# Patient Record
Sex: FEMALE | Race: WHITE | Employment: FULL TIME | URBAN - METROPOLITAN AREA
[De-identification: names, ages, dates, MRNs, and addresses within clinical notes are randomized per-mention and may not be internally consistent; named-entity substitution may affect disease eponyms.]

---

## 2017-02-21 ENCOUNTER — DOCUMENTATION ONLY (OUTPATIENT)
Dept: FAMILY MEDICINE CLINIC | Age: 57
End: 2017-02-21

## 2017-03-17 ENCOUNTER — PATIENT OUTREACH (OUTPATIENT)
Dept: FAMILY MEDICINE CLINIC | Age: 57
End: 2017-03-17

## 2017-03-17 NOTE — PROGRESS NOTES
Patient Hospitalized,  1/2/17 - 3/17/17 at [facility], related to Systolic Congestive Heart Failure/LVAD placement(complications, GI Bleed, Trach on 2/2/17, C-diff). Discharged to [facility] Inpatient REHAB, 3/17/17.  3/17/17, This writer/NN and [facility] access, printed recent progress note for Dr Harriet Crane to review, patient last seen at [facility]/Dr Haynes, Sept 2016. Per documentation, [facility] to accept patient for further Inpatient REHAB. 3/17/17, This writer/NN contacted [facility], Inpatient REHAB, able to confirm patient being admitted today, 3/17/17, agrees to keep [facility] contact information to call with updates, questions or concerns. PLAN: Continue to monitor for discharge. See Previous NN Documentation Note:  RACHEL REPORT. See Media, 11/29/16, office note from [facility] [address], Systolic CHF, Cardiomyopathy(EF 10-15%) on Dobutamine and followed by [facility] , possible Heart Transplant Patient/LVAD as bridge, work-up continues. Also followed by ENDO, A1C 7.5 in December 2016.  2/21/17, This writer/NN and [facility] access, able to confirm patient remains inpatient, admitted, 1/2/17 related to Systolic Congestive Heart Failure/LVAD placement(complications, GI Bleed, Trach on 2/2/17, C-diff, anticipate REHAB). Will continue to monitor for discharge.

## 2017-03-23 ENCOUNTER — PATIENT OUTREACH (OUTPATIENT)
Dept: FAMILY MEDICINE CLINIC | Age: 57
End: 2017-03-23

## 2017-03-29 ENCOUNTER — PATIENT OUTREACH (OUTPATIENT)
Dept: FAMILY MEDICINE CLINIC | Age: 57
End: 2017-03-29

## 2017-03-29 NOTE — PROGRESS NOTES
Patient Hospitalized,  1/2/17 - 3/17/17 at [facility], related to Systolic Congestive Heart Failure/LVAD placement(complications, GI Bleed, Trach on 2/2/17, C-diff). Discharged to [facility] Inpatient REHAB, 3/17/17.  3/29/17, This writer/NN and [facility] access, able to confirm patient remains INPATIENT/REHAB at [facility]. Will continue to monitor for discharge. 3/29/17, This writer/NN attempted to contact [facility], SW/Discharge Planner, Bernard, [phone], left voicemail message with [facility] contact information and request for return call. See Previous NN/Patient Outreach Documentation:  3/23/17, This writer/NN contacted [facility], Inpatient REHAB, able to confirm social work/discharge planner will be [phone]), left voicemail message with [facility] contact information and request for return call. See Previous NN/Patient Outreach Documentation:  Patient last seen by Dr Lee Costa, Sept 2016. Patient also followed by ENDO, last seen Dec 2016(A1C at 7.5). ANTHEM REPORT. See Media, 11/29/16, office note from [facility] [address], Systolic CHF, Cardiomyopathy(EF 10-15%) on Dobutamine and followed by [facility] , possible Heart Transplant Patient/LVAD as bridge, work-up continues. Also followed by ENDO, A1C 7.5 in December 2016.  2/21/17, This writer/NN and [facility] access, able to confirm patient remains inpatient, admitted, 1/2/17 related to Systolic Congestive Heart Failure/LVAD placement(complications, GI Bleed, Trach on 2/2/17, C-diff, anticipate REHAB).

## 2017-04-04 ENCOUNTER — PATIENT OUTREACH (OUTPATIENT)
Dept: FAMILY MEDICINE CLINIC | Age: 57
End: 2017-04-04

## 2017-05-08 DIAGNOSIS — L21.9 SEBORRHEA: ICD-10-CM

## 2017-05-08 RX ORDER — CLOBETASOL PROPIONATE 0.46 MG/ML
SOLUTION TOPICAL
Qty: 50 ML | Refills: 11 | Status: SHIPPED | OUTPATIENT
Start: 2017-05-08 | End: 2017-08-09 | Stop reason: SDUPTHER

## 2017-05-12 RX ORDER — DULOXETIN HYDROCHLORIDE 60 MG/1
CAPSULE, DELAYED RELEASE ORAL
Qty: 90 CAP | Refills: 1 | Status: SHIPPED | OUTPATIENT
Start: 2017-05-12 | End: 2020-03-04

## 2017-05-24 ENCOUNTER — PATIENT OUTREACH (OUTPATIENT)
Dept: FAMILY MEDICINE CLINIC | Age: 57
End: 2017-05-24

## 2017-05-24 NOTE — PROGRESS NOTES
Patient S/P 1/2/17 - 3/17/17 at [facility], related to Systolic Congestive Heart Failure/LVAD placement(complications, GI Bleed, Trach on 2/2/17, C-diff). Discharged to [facility] Inpatient REHAB, 3/17/17  5/24/17, Per chart review, no further ED/Hospitalizations noted at this time, however, no F/U appt scheduled at [facility]. Patient last seen by Dr Alejandra Chong, Sept 2016.  5/24/17, This writer/NN contacted patient at listed phone number, HIPAA verified with 2 identifiers. Patient allowed this writer/NN to explain purpose of call, post hospitalization and importance of F/U appts and continued health maintenance. Patient verbalizes understanding, however states working closely with [facility], does not want to schedule appt with Dr Alejandra Chong at this time. Patient states [facility] complete at this time. Patient agrees to call as needed with changes or concerns and call to schedule F/U appts as needed. Patient denies further questions or concerns at this time. PLAN: Will close above transitions of care/post hospitalization. Should patient return call or be seen for F/U appt, discuss interest in CCM/NN support, discuss symptoms management, further concerns, medications(Compliance and Reconciliation), and F/U appts. Provide instruction, goal work and resource connection as indicated. See Previous NN/Patient Outreach Documentation:  4/4/17, This writer/NN contacted [facility], [phone], able to confirm, patient home, seen by nurse for labs today, 4/4/17 and next visit scheduled for Friday, 4/7/17, agrees to have nurse discuss PCP and request call to [facility] should need further assistance. See Previous NN/Patient Outreach Documentation:  Patient Hospitalized,  1/2/17 - 3/17/17 at [facility], related to Systolic Congestive Heart Failure/LVAD placement(complications, GI Bleed, Trach on 2/2/17, C-diff).  Discharged to [facility] Inpatient [facility], 3/17/17.  3/29/17, This writer/NN and [facility] access, able to confirm patient remains INPATIENT/REHAB at [facility]. Will continue to monitor for discharge. 3/29/17, This writer/NN attempted to contact [facility], SW/Discharge Planner, Elder Pryor, [phone], left voicemail message with [facility] contact information and request for return call. See Previous NN/Patient Outreach Documentation:  Patient last seen by Dr Pascual Foreman, Sept 2016. Patient also followed by ENDO, last seen Dec 2016(A1C at 7.5). RACHEL REPORT. See Media, 11/29/16, office note from [facility] [address], Systolic CHF, Cardiomyopathy(EF 10-15%) on Dobutamine and followed by [facility], possible Heart Transplant Patient/LVAD as bridge, work-up continues. Also followed by ENDO, A1C 7.5 in December 2016.  2/21/17, This writer/NN and [facility] access, able to confirm patient remains inpatient, admitted, 1/2/17 related to Systolic Congestive Heart Failure/LVAD placement(complications, GI Bleed, Trach on 2/2/17, C-diff, anticipate REHAB).

## 2017-05-24 NOTE — Clinical Note
Dr Tereso Rivera, JANNETH---S/P 1/2/17 - 3/17/17 at [facility], related to Systolic Congestive Heart Failure/LVAD placement(complications, GI Bleed, Trach on 2/2/17, C-diff). Discharged to [facility] Inpatient REHAB, 3/17/17 5/24/17, Per chart review, no further ED/Hospitalizations noted at this time, however, no F/U appt scheduled at [facility]. Patient last seen by Dr Tereso Rivera, Sept 2016. 5/24/17, This writer/NN contacted patient at listed phone number, HIPAA verified with 2 identifiers. Patient allowed this writer/NN to explain purpose of call, post hospitalization and importance of F/U appts and continued health maintenance. Patient verbalizes understanding, however states working closely with [facility], does not want to schedule appt with Dr Tereso Rivera at this time. Patient states [facility] complete at this time.  Patient agrees to call as needed with changes or concerns and call to schedule F/U appts as needed

## 2017-07-26 RX ORDER — RIZATRIPTAN BENZOATE 10 MG/1
TABLET, ORALLY DISINTEGRATING ORAL
Qty: 12 TAB | Refills: 5 | Status: SHIPPED | OUTPATIENT
Start: 2017-07-26 | End: 2018-12-24 | Stop reason: SDUPTHER

## 2017-08-08 ENCOUNTER — TELEPHONE (OUTPATIENT)
Dept: FAMILY MEDICINE CLINIC | Age: 57
End: 2017-08-08

## 2017-08-08 DIAGNOSIS — L21.9 SEBORRHEA: ICD-10-CM

## 2017-08-08 NOTE — TELEPHONE ENCOUNTER
Patient called for update on medication request from  [facility] regarding her folliculitis issue. Patient was advised of no medication order received and appointment was required for re-evaluation. This is per Dr. Elisa Rao office notes of December visit.     I have advised to follow up with derm/pcp

## 2017-08-08 NOTE — TELEPHONE ENCOUNTER
Patient called stating that she needs her clobetasol 0.05% solution sent to [facility]. Dr. Joanne Tang wrote this on 5/8/17 at sent it to her [facility] pharmacy, but she states that she now uses [facility] as it is much cheaper. Please advise.

## 2017-08-09 RX ORDER — CLOBETASOL PROPIONATE 0.46 MG/ML
SOLUTION TOPICAL
Qty: 150 ML | Refills: 0 | Status: SHIPPED | COMMUNITY
Start: 2017-08-09 | End: 2018-06-13 | Stop reason: ALTCHOICE

## 2017-08-09 NOTE — TELEPHONE ENCOUNTER
Patient called for status, notified patient request addressed and sent to [facility].  Patient is satisfied

## 2017-10-23 DIAGNOSIS — E78.2 MIXED HYPERLIPIDEMIA: ICD-10-CM

## 2017-10-23 DIAGNOSIS — E11.9 TYPE 2 DIABETES MELLITUS WITHOUT COMPLICATION, WITHOUT LONG-TERM CURRENT USE OF INSULIN (HCC): ICD-10-CM

## 2017-10-24 RX ORDER — SITAGLIPTIN 100 MG/1
TABLET, FILM COATED ORAL
Qty: 90 TAB | Refills: 3 | Status: SHIPPED | OUTPATIENT
Start: 2017-10-24 | End: 2018-11-19 | Stop reason: SDUPTHER

## 2018-02-19 ENCOUNTER — TELEPHONE (OUTPATIENT)
Dept: FAMILY MEDICINE CLINIC | Age: 58
End: 2018-02-19

## 2018-02-19 ENCOUNTER — OFFICE VISIT (OUTPATIENT)
Dept: FAMILY MEDICINE CLINIC | Age: 58
End: 2018-02-19

## 2018-02-19 VITALS
RESPIRATION RATE: 18 BRPM | HEIGHT: 63 IN | SYSTOLIC BLOOD PRESSURE: 101 MMHG | TEMPERATURE: 98.3 F | BODY MASS INDEX: 32.07 KG/M2 | DIASTOLIC BLOOD PRESSURE: 69 MMHG | HEART RATE: 88 BPM | WEIGHT: 181 LBS | OXYGEN SATURATION: 97 %

## 2018-02-19 DIAGNOSIS — G47.00 INSOMNIA, UNSPECIFIED TYPE: ICD-10-CM

## 2018-02-19 RX ORDER — ZOLPIDEM TARTRATE 10 MG/1
TABLET ORAL
Qty: 30 TAB | Refills: 2 | Status: SHIPPED | OUTPATIENT
Start: 2018-02-19 | End: 2018-05-08 | Stop reason: SDUPTHER

## 2018-02-19 NOTE — PROGRESS NOTES
Chief Complaint   Patient presents with    Medication Refill     ambien     1. Have you been to the ER, urgent care clinic since your last visit? Hospitalized since your last visit? No    2. Have you seen or consulted any other health care providers outside of the [address] [place] [address] since your last visit? Include any pap smears or colon screening.  No

## 2018-02-19 NOTE — PROGRESS NOTES
Estuardo Grider  61 y.o. female  1960  [address] [facility] [address] [facility]  [id number]   [address]: Progress Note  Michael Naik MD       Encounter Date: 2018    Chief Complaint   Patient presents with    Medication Refill     Fernando Junior     History of Present Illness   Feliciano Patino is a 61 y.o. female who presents to clinic today for evaluation on insomnia. On and off ambien for many years (). Tomorrow she could fill, but  . Doesn't work like it used to. Had tried belsomna, other medications. Mind would race and race. Both falling and staying sleep. Had been on the 5s for some time, but increased to the 10mg. Notes that when she eats with it, it wont work as well. Review of Systems   Review of Systems   Psychiatric/Behavioral: The patient has insomnia. Vitals/Objective:     Vitals:    18 1456   BP: 101/69   Pulse: 88   Resp: 18   Temp: 98.3 °F (36.8 °C)   TempSrc: Oral   SpO2: 97%   Weight: 181 lb (82.1 kg)   Height: 5' 3\" (1.6 m)     Body mass index is 32.06 kg/(m^2). Physical Exam   Constitutional: She is oriented to person, place, and time. No distress. Cardiovascular: Normal rate, regular rhythm and normal heart sounds. Pulmonary/Chest: Effort normal and breath sounds normal.   Neurological: She is alert and oriented to person, place, and time. Psychiatric: She has a normal mood and affect. Her behavior is normal.       Assessment and Plan:   1. Insomnia, unspecified type  Discussed concern about 10mg Ambien in females and the current recommendations of a max 5mg Ambien in females. Discussed increased risk of adverse effects including prolonged duration of action. Patient has been on this current dose of an extended period of time and notes multiple trials of alternative sleep aids. Will continue at this time, but noted as she gets older,we will need to find alternatives to this sleep aid, or use it at a lower dose.   She expressed understanding and could teach back what we discussed. - zolpidem (AMBIEN) 10 mg tablet; TAKE 1 TABLET BY MOUTH AT BEDTIME AS NEEDED FOR SLEEP  Dispense: 30 Tab; Refill: 2    I have discussed the diagnosis with the patient and the intended plan as seen in the above orders. she has expressed understanding. The patient has received an after-visit summary and questions were answered concerning future plans. I have discussed medication side effects and warnings with the patient as well. Follow-up Disposition:  Return in about 6 months (around 8/19/2018). Electronically Signed: Juana Ramesh MD     History   Patients past medical, surgical and family histories were reviewed and updated. Past Medical History:   Diagnosis Date    CAD (coronary artery disease)     [facility]     Depression     DM (diabetes mellitus) ([facility] [address]     Heart disease     Neuropathy      Past Surgical History:   Procedure Laterality Date    HX BLADDER SUSPENSION  2006, 2009    HX CHOLECYSTECTOMY  2004    HX PACEMAKER PLACEMENT  2008     Family History   Problem Relation Age of Onset    Diabetes Mother     Hypertension Mother     Hypertension Father     Dementia Father     Diabetes Brother     Hypertension Brother      Social History     Social History    Marital status:      Spouse name: N/A    Number of children: N/A    Years of education: N/A     Occupational History    Not on file.      Social History Main Topics    Smoking status: Never Smoker    Smokeless tobacco: Never Used    Alcohol use No    Drug use: No    Sexual activity: Yes     Other Topics Concern    Not on file     Social History Narrative            Current Medications/Allergies     Current Outpatient Prescriptions   Medication Sig Dispense Refill    zolpidem (AMBIEN) 10 mg tablet TAKE 1 TABLET BY MOUTH AT BEDTIME AS NEEDED FOR SLEEP 30 Tab 2    JANUVIA 100 mg tablet TAKE 1 TABLET DAILY 90 Tab 3  DULoxetine (CYMBALTA) 60 mg capsule TAKE 1 CAPSULE DAILY 90 Cap 1    glucose blood VI test strips (ONETOUCH ULTRA TEST) strip Test blood glucose twice daily Dx Code: E11.65 100 Strip 11    LORazepam (ATIVAN) 0.5 mg tablet Take  by mouth.  potassium chloride 20 mEq TbER Take 20 mEq by mouth two (2) times a day.  clobetasol (TEMOVATE) 0.05 % external solution APPLY SOLUTION TOPICALLY ONCE OR TWICE DAILY 150 mL 0    rizatriptan (MAXALT-MLT) 10 mg disintegrating tablet DISSOLVE ONE TABLET IN MOUTH ONCE AS NEEDED FOR MIGRAINE FOR 1 DOSE 12 Tab 5    DOBUTamine (DOBUTREX) 1000 mg/250 ml D5W infusion 2.5-10 mcg/kg/min by IntraVENous route TITRATE.  carvedilol (COREG CR) 10 mg CR capsule Take 40 mg by mouth daily (with breakfast).  fluticasone (FLONASE) 50 mcg/actuation nasal spray 2 Sprays by Both Nostrils route daily. 1 Bottle 5    ketoconazole (NIZORAL) 2 % shampoo Apply  to affected area daily as needed for Itching.  mupirocin (BACTROBAN) 2 % ointment APPLY  OINTMENT TOPICALLY TO AFFECTED AREA ONCE DAILY 22 g 0    spironolactone (ALDACTONE) 25 mg tablet Take 25 mg by mouth daily.  bumetanide (BUMEX) 2 mg tablet Take 1 mg by mouth daily.  lovastatin (MEVACOR) 20 mg tablet TAKE ONE TABLET BY MOUTH ONCE DAILY AT BEDTIME 30 Tab 0    digoxin (LANOXIN) 0.25 mg tablet Take 0.125 mg by mouth daily.        Allergies   Allergen Reactions    Compazine [Prochlorperazine] Nausea and Vomiting    Glimepiride Rash    Morphine Hives    Reglan [Metoclopramide Hcl] Itching    Sulfa (Sulfonamide Antibiotics) Nausea Only    Vicodin [Hydrocodone-Acetaminophen] Hives and Itching

## 2018-02-19 NOTE — PATIENT INSTRUCTIONS

## 2018-02-19 NOTE — MR AVS SNAPSHOT
[address] [phone] Patient: Meg Driscoll MRN: [id number] PUC:9/17/3519 Visit Information Date & Time Provider Department Dept. Phone Encounter #  
 2/19/2018  2:40 PM Maria Del Carmen Tolu, [address] [place] [address] 88 Follow-up Instructions Return in about 6 months (around 8/19/2018). Upcoming Health Maintenance Date Due Hepatitis C Screening 1960 FOOT EXAM Q1 3/21/1969 EYE EXAM RETINAL OR DILATED Q1 3/21/1969 Pneumococcal 19-64 Medium Risk (1 of 1 - PPSV23) 3/21/1978 DTaP/Tdap/Td series (1 - Tdap) 3/21/1980 PAP AKA CERVICAL CYTOLOGY 3/21/1980 BREAST CANCER SCRN MAMMOGRAM 3/21/2009 FOBT Q 1 YEAR AGE 50-75 3/21/2009 LIPID PANEL Q1 5/5/2016 HEMOGLOBIN A1C Q6M 6/15/2017 MICROALBUMIN Q1 7/15/2017 Influenza Age 5 to Adult 8/1/2017 Allergies as of 2/19/2018  Review Complete On: 2/19/2018 By: José Antonio Morris LPN Severity Noted Reaction Type Reactions Compazine [Prochlorperazine]  11/08/2012    Nausea and Vomiting Glimepiride  02/25/2013    Rash Morphine  11/08/2012    Hives Reglan [Metoclopramide Hcl]  11/08/2012    Itching Sulfa (Sulfonamide Antibiotics)  11/08/2012    Nausea Only Vicodin [Hydrocodone-acetaminophen]  11/08/2012    Hives, Itching Current Immunizations  Reviewed on 11/25/2015 Name Date Influenza Vaccine Patrizia Alexanderr) 11/25/2015 Not reviewed this visit You Were Diagnosed With   
  
 Codes Comments Insomnia, unspecified type     ICD-10-CM: G47.00 ICD-9-CM: 780.52 Vitals BP Pulse Temp Resp Height(growth percentile) Weight(growth percentile) 101/69 (BP 1 Location: Right arm, BP Patient Position: Sitting) 88 98.3 °F (36.8 °C) (Oral) 18 5' 3\" (1.6 m) 181 lb (82.1 kg) SpO2 BMI OB Status Smoking Status 97% 32.06 kg/m2 Ablation Never Smoker Vitals History BMI and BSA Data Body Mass Index Body Surface Area 32.06 kg/m 2 1.91 m 2 Preferred Pharmacy Pharmacy Name Phone [phone] [phone] Your Updated Medication List  
  
   
This list is accurate as of: 2/19/18  3:17 PM.  Always use your most recent med list.  
  
  
  
  
 bumetanide 2 mg tablet Commonly known as:  [place] Take 1 mg by mouth daily. clobetasol 0.05 % external solution Commonly known as:  TEMOVATE  
APPLY SOLUTION TOPICALLY ONCE OR TWICE DAILY COREG CR 10 mg CR capsule Generic drug:  carvedilol Take 40 mg by mouth daily (with breakfast). digoxin 0.25 mg tablet Commonly known as:  LANOXIN Take 0.125 mg by mouth daily. DOBUTamine (DOBUTREX) 1000 mg/250 ml D5W infusion 2.5-10 mcg/kg/min by IntraVENous route TITRATE. DULoxetine 60 mg capsule Commonly known as:  CYMBALTA TAKE 1 CAPSULE DAILY  
  
 fluticasone 50 mcg/actuation nasal spray Commonly known as:  Fabiene Gift 2 Sprays by Both Nostrils route daily. glucose blood VI test strips strip Commonly known as:  ONETOUCH ULTRA TEST Test blood glucose twice daily Dx Code: E11.65 JANUVIA 100 mg tablet Generic drug:  SITagliptin TAKE 1 TABLET DAILY  
  
 ketoconazole 2 % shampoo Commonly known as:  NIZORAL Apply  to affected area daily as needed for Itching. LORazepam 0.5 mg tablet Commonly known as:  ATIVAN Take  by mouth.  
  
 lovastatin 20 mg tablet Commonly known as:  MEVACOR  
TAKE ONE TABLET BY MOUTH ONCE DAILY AT BEDTIME  
  
 mupirocin 2 % ointment Commonly known as:  BACTROBAN  
APPLY  OINTMENT TOPICALLY TO AFFECTED AREA ONCE DAILY potassium chloride SR 20 mEq tablet Commonly known as:  K-TAB Take 20 mEq by mouth two (2) times a day. rizatriptan 10 mg disintegrating tablet Commonly known as:  MAXALT-MLT DISSOLVE ONE TABLET IN MOUTH ONCE AS NEEDED FOR MIGRAINE FOR 1 DOSE  
  
 spironolactone 25 mg tablet Commonly known as:  ALDACTONE Take 25 mg by mouth daily. zolpidem 10 mg tablet Commonly known as:  AMBIEN  
TAKE 1 TABLET BY MOUTH AT BEDTIME AS NEEDED FOR SLEEP Prescriptions Printed Refills  
 zolpidem (AMBIEN) 10 mg tablet 2 Sig: TAKE 1 TABLET BY MOUTH AT BEDTIME AS NEEDED FOR SLEEP Class: Print Follow-up Instructions Return in about 6 months (around 8/19/2018). Patient Instructions Learning About Sleeping Well What does sleeping well mean? Sleeping well means getting enough sleep. How much sleep is enough varies among people. The number of hours you sleep is not as important as how you feel when you wake up. If you do not feel refreshed, you probably need more sleep. Another sign of not getting enough sleep is feeling tired during the day. The average total nightly sleep time is 7½ to 8 hours. Healthy adults may need a little more or a little less than this. Why is getting enough sleep important? Getting enough quality sleep is a basic part of good health. When your sleep suffers, your mood and your thoughts can suffer too. You may find yourself feeling more grumpy or stressed. Not getting enough sleep also can lead to serious problems, including injury, accidents, anxiety, and depression. What might cause poor sleeping? Many things can cause sleep problems, including: · Stress. Stress can be caused by fear about a single event, such as giving a speech. Or you may have ongoing stress, such as worry about work or school. · Depression, anxiety, and other mental or emotional conditions. · Changes in your sleep habits or surroundings. This includes changes that happen where you sleep, such as noise, light, or sleeping in a different bed. It also includes changes in your sleep pattern, such as having jet lag or working a late shift. · Health problems, such as pain, breathing problems, and restless legs syndrome. · Lack of regular exercise. How can you help yourself? Here are some tips that may help you sleep more soundly and wake up feeling more refreshed. Your sleeping area · Use your bedroom only for sleeping and sex. A bit of light reading may help you fall asleep. But if it doesn't, do your reading elsewhere in the house. Don't watch TV in bed. · Be sure your bed is big enough to stretch out comfortably, especially if you have a sleep partner. · Keep your bedroom quiet, dark, and cool. Use curtains, blinds, or a sleep mask to block out light. To block out noise, use earplugs, soothing music, or a \"white noise\" machine. Your evening and bedtime routine · Create a relaxing bedtime routine. You might want to take a warm shower or bath, listen to soothing music, or drink a cup of noncaffeinated tea. · Go to bed at the same time every night. And get up at the same time every morning, even if you feel tired. What to avoid · Limit caffeine (coffee, tea, caffeinated sodas) during the day, and don't have any for at least 4 to 6 hours before bedtime. · Don't drink alcohol before bedtime. Alcohol can cause you to wake up more often during the night. · Don't smoke or use tobacco, especially in the evening. Nicotine can keep you awake. · Don't take naps during the day, especially close to bedtime. · Don't lie in bed awake for too long. If you can't fall asleep, or if you wake up in the middle of the night and can't get back to sleep within 15 minutes or so, get out of bed and go to another room until you feel sleepy. · Don't take medicine right before bed that may keep you awake or make you feel hyper or energized. Your doctor can tell you if your medicine may do this and if you can take it earlier in the day. If you can't sleep · Imagine yourself in a peaceful, pleasant scene.  Focus on the details and feelings of being in a place that is relaxing. · Get up and do a quiet or boring activity until you feel sleepy. · Don't drink any liquids after 6 p.m. if you wake up often because you have to go to the bathroom. Where can you learn more? Go to http://rene-bryon.info/. Enter G461 in the search box to learn more about \"Learning About Sleeping Well. \" Current as of: May 12, 2017 Content Version: 11.4 © 4904-0802 [employer]. Care instructions adapted under license by [employer] (which disclaims liability or warranty for this information). If you have questions about a medical condition or this instruction, always ask your healthcare professional. [address] any warranty or liability for your use of this information. Introducing [facility]! Jude Pike introduces PayParrot patient portal. Now you can access parts of your medical record, email your doctor's office, and request medication refills online. 1. In your internet browser, go to [email or web address] in the Cloud 2. Click on the First Time User? Click Here link in the Sign In box. You will see the New Member Sign Up page. 3. Enter your PayParrot Access Code exactly as it appears below. You will not need to use this code after youve completed the sign-up process. If you do not sign up before the expiration date, you must request a new code. · PayParrot Access Code: [email or web address] Expires: 5/20/2018  3:17 PM 
 
4. Enter the last four digits of your Social Security Number (xxxx) and Date of Birth (mm/dd/yyyy) as indicated and click Submit. You will be taken to the next sign-up page. 5. Create a Captualt ID. This will be your PayParrot login ID and cannot be changed, so think of one that is secure and easy to remember. 6. Create a PayParrot password. You can change your password at any time. 7. Enter your Password Reset Question and Answer.  This can be used at a later time if you forget your password. 8. Enter your e-mail address. You will receive e-mail notification when new information is available in [address] 9. Click Sign Up. You can now view and download portions of your medical record. 10. Click the Download Summary menu link to download a portable copy of your medical information. If you have questions, please visit the Frequently Asked Questions section of the [employer] website. Remember, [employer] is NOT to be used for urgent needs. For medical emergencies, dial 911. Now available from your iPhone and Android! Please provide this summary of care documentation to your next provider. Your primary care clinician is listed as Estee Duncan. If you have any questions after today's visit, please call [phone].

## 2018-06-13 ENCOUNTER — OFFICE VISIT (OUTPATIENT)
Dept: FAMILY MEDICINE CLINIC | Age: 58
End: 2018-06-13

## 2018-06-13 VITALS
RESPIRATION RATE: 16 BRPM | HEIGHT: 63 IN | HEART RATE: 87 BPM | OXYGEN SATURATION: 98 % | TEMPERATURE: 98 F | DIASTOLIC BLOOD PRESSURE: 61 MMHG | SYSTOLIC BLOOD PRESSURE: 87 MMHG

## 2018-06-13 DIAGNOSIS — M70.22 OLECRANON BURSITIS OF LEFT ELBOW: Primary | ICD-10-CM

## 2018-06-13 RX ORDER — OXYBUTYNIN CHLORIDE 5 MG/1
5 TABLET ORAL DAILY
COMMUNITY

## 2018-06-13 RX ORDER — AMIODARONE HYDROCHLORIDE 100 MG/1
100 TABLET ORAL DAILY
COMMUNITY
End: 2019-05-03 | Stop reason: ALTCHOICE

## 2018-06-13 RX ORDER — WARFARIN SODIUM 5 MG/1
TABLET ORAL
COMMUNITY

## 2018-06-13 NOTE — PROGRESS NOTES
Chief Complaint   Patient presents with    Mass     on left elbow X 2 wks.  Painful and continues to increase in size

## 2018-06-13 NOTE — PROGRESS NOTES
Samson Krueger is a 60 y.o. female who presents for pain and swelling over the posterior left elbow for about 2 weeks. No injury or trauma. No fever. Sx unchanged for 2 weeks. No rash or redness over the elbow. No self treatments. Hurts to lean on her elbow. PMHx:  Past Medical History:   Diagnosis Date    CAD (coronary artery disease)     [facility] ([facility]     Depression     DM (diabetes mellitus) ([facility]     Heart disease     Neuropathy        Meds:   Current Outpatient Prescriptions   Medication Sig Dispense Refill    warfarin (COUMADIN) 5 mg tablet Take 5 mg by mouth daily.  amiodarone (PACERONE) 100 mg tablet Take 100 mg by mouth daily.  oxybutynin (DITROPAN) 5 mg tablet Take 5 mg by mouth three (3) times daily.  zolpidem (AMBIEN) 10 mg tablet TAKE 1 TABLET BY MOUTH EVERY DAY AT BEDTIME AS NEEDED FOR SLEEP 30 Tab 5    JANUVIA 100 mg tablet TAKE 1 TABLET DAILY 90 Tab 3    rizatriptan (MAXALT-MLT) 10 mg disintegrating tablet DISSOLVE ONE TABLET IN MOUTH ONCE AS NEEDED FOR MIGRAINE FOR 1 DOSE 12 Tab 5    DULoxetine (CYMBALTA) 60 mg capsule TAKE 1 CAPSULE DAILY 90 Cap 1    glucose blood VI test strips (ONETOUCH ULTRA TEST) strip Test blood glucose twice daily Dx Code: E11.65 100 Strip 11    LORazepam (ATIVAN) 0.5 mg tablet Take  by mouth.  potassium chloride 20 mEq TbER Take 20 mEq by mouth two (2) times a day. Allergies:    Allergies   Allergen Reactions    Compazine [Prochlorperazine] Nausea and Vomiting    Glimepiride Rash    Morphine Hives    Reglan [Metoclopramide Hcl] Itching    Sulfa (Sulfonamide Antibiotics) Nausea Only    Vicodin [Hydrocodone-Acetaminophen] Hives and Itching       Smoker:  History   Smoking Status    Never Smoker   Smokeless Tobacco    Never Used       ETOH:   History   Alcohol Use No       FH:   Family History   Problem Relation Age of Onset    Diabetes Mother     Hypertension Mother     Hypertension Father    [facility] Dementia Father     Diabetes Brother     Hypertension Brother        ROS:  Per HPI    Physical Exam:  Visit Vitals    BP (!) 87/61    Pulse 87    Temp 98 °F (36.7 °C)    Resp 16    Ht 5' 3\" (1.6 m)    SpO2 98%     Gen: NAD  MSK: Small amount of localized swelling over the olecranon of the left elbow. FROM of the elbow and wrist flex/ext. FROM of forearm sup/pronation. Full strength of the wrist and elbow. Pain with resisted elbow extension. Tenderness over the olecranon and olecranon bursa. Ext: Sensation intact in the upper ext. Well perfused and no edema. Skin: No rash. Assessment:    ICD-10-CM ICD-9-CM    1. Olecranon bursitis of left elbow M70.22 726.33    Discussed treatment options. Discussed that since she is on coumadin and has DM, she is at risk of bleeding and infection (respectively) with any procedure such as aspiration. Shankar Dark is small and she has not tried conservative management yet. Therefore, recommended that she try conservative management. Plan:  Avoid leaning on elbow. Ice 10 minutes, three times a day PRN    Medications:    1. Naproxin (Aleve): 220mg 1 tablet twice a day PRN. 2. Acetaminophen (Tylenol):  500mg 1-2 tablets every 6 hours as needed for pain.     RTC: PRN

## 2018-07-02 ENCOUNTER — OFFICE VISIT (OUTPATIENT)
Dept: FAMILY MEDICINE CLINIC | Age: 58
End: 2018-07-02

## 2018-07-02 VITALS
HEIGHT: 63 IN | HEART RATE: 86 BPM | SYSTOLIC BLOOD PRESSURE: 118 MMHG | WEIGHT: 179 LBS | DIASTOLIC BLOOD PRESSURE: 89 MMHG | OXYGEN SATURATION: 99 % | BODY MASS INDEX: 31.71 KG/M2 | TEMPERATURE: 98.6 F | RESPIRATION RATE: 16 BRPM

## 2018-07-02 DIAGNOSIS — M70.22 OLECRANON BURSITIS OF LEFT ELBOW: Primary | ICD-10-CM

## 2018-07-02 RX ORDER — AMLODIPINE BESYLATE 5 MG/1
2.5 TABLET ORAL DAILY
COMMUNITY
Start: 2018-05-11

## 2018-07-02 NOTE — PATIENT INSTRUCTIONS
Elbow Bursitis: Exercises  Your Care Instructions  Here are some examples of typical rehabilitation exercises for your condition. Start each exercise slowly. Ease off the exercise if you start to have pain. Your doctor or physical therapist will tell you when you can start these exercises and which ones will work best for you. How to do the exercises  Elbow flexion stretch    1. Lift the arm that bothers you, and bend the elbow. Your palm should face toward you. 2. With your other hand, gently push on the back of your affected forearm. Press your hand toward your shoulder until you feel a stretch in the back of your upper arm. 3. Hold for at least 15 to 30 seconds. 4. Repeat 2 to 4 times. Elbow extension stretch    1. Extend your affected arm in front of you with your palm facing away from you. 2. Bend back your wrist, pointing your hand up toward the ceiling. 3. With your other hand, gently bend your wrist farther until you feel a mild to moderate stretch in your forearm. 4. Hold for at least 15 to 30 seconds. 5. Repeat 2 to 4 times. 6. Repeat steps 1 through 5. But this time extend your affected arm in front of you with your palm facing up. Then bend back your wrist, pointing your hand toward the floor. Pronation and supination stretch    1. Keep your affected elbow at your side, bent at about 90 degrees. Grasp a pen, pencil, or stick, and wrap your hand around it. If you don't have something to hold on to, make a fist instead. 2. Slowly turn your forearm as far as you can back and forth in each direction. Your hand should face up and then down. 3. Hold each position for about 6 seconds. 4. Relax for up to 10 seconds between repetitions. 5. Repeat 8 to 12 times. Hand flips    1. While seated, place your affected forearm on your thigh. Your palm should face down. 2. Flip your hand over so the back of your hand rests on your thigh and your palm is up.  Alternate between palm up and palm down while keeping your forearm on your thigh. 3. Repeat 8 to 12 times. Follow-up care is a key part of your treatment and safety. Be sure to make and go to all appointments, and call your doctor if you are having problems. It's also a good idea to know your test results and keep a list of the medicines you take. Where can you learn more? Go to http://rene-bryon.info/. Enter A704 in the search box to learn more about \"Elbow Bursitis: Exercises. \"  Current as of: March 21, 2017  Content Version: 11.4  © 0225-6348 [employer]. Care instructions adapted under license by [employer] (which disclaims liability or warranty for this information). If you have questions about a medical condition or this instruction, always ask your healthcare professional. [address] any warranty or liability for your use of this information.

## 2018-07-02 NOTE — PROGRESS NOTES
Subjective    Chief complaint: follow-up of olecrenon bursitis     Nahomi Lenz is an 60 y.o. female who is following up for left olecrenon bursitis. She was seen approximately 2 weeks ago in clinic and diagnosed with olecrenon bursitis at that time and recommendation was to pursue conservative management. She states that she has not been taking her aleeve and tylenol as prescribed and not doing any exercises. No new symptoms, not affecting ROM of elbow or causing pain. Some soreness when she rests on her left elbow. Allergies - reviewed: Allergies   Allergen Reactions    Compazine [Prochlorperazine] Nausea and Vomiting    Glimepiride Rash    Morphine Hives    Reglan [Metoclopramide Hcl] Itching    Sulfa (Sulfonamide Antibiotics) Nausea Only    Vicodin [Hydrocodone-Acetaminophen] Hives and Itching         Medications - reviewed:   Current Outpatient Prescriptions   Medication Sig    amLODIPine (NORVASC) 5 mg tablet     warfarin (COUMADIN) 5 mg tablet Take 5 mg by mouth daily.  amiodarone (PACERONE) 100 mg tablet Take 100 mg by mouth daily.  oxybutynin (DITROPAN) 5 mg tablet Take 5 mg by mouth three (3) times daily.  zolpidem (AMBIEN) 10 mg tablet TAKE 1 TABLET BY MOUTH EVERY DAY AT BEDTIME AS NEEDED FOR SLEEP    JANUVIA 100 mg tablet TAKE 1 TABLET DAILY    rizatriptan (MAXALT-MLT) 10 mg disintegrating tablet DISSOLVE ONE TABLET IN MOUTH ONCE AS NEEDED FOR MIGRAINE FOR 1 DOSE    DULoxetine (CYMBALTA) 60 mg capsule TAKE 1 CAPSULE DAILY    glucose blood VI test strips (ONETOUCH ULTRA TEST) strip Test blood glucose twice daily Dx Code: E11.65    LORazepam (ATIVAN) 0.5 mg tablet Take  by mouth.  potassium chloride 20 mEq TbER Take 20 mEq by mouth two (2) times a day. No current facility-administered medications for this visit.           Past Medical History - reviewed:  Past Medical History:   Diagnosis Date    CAD (coronary artery disease)     [facility]  Depression     DM [facility]diabetes mellitus) [facility]     Heart disease     Neuropathy          Immunizations - reviewed:   Immunization History   Administered Date(s) Administered    Influenza Vaccine ([facility]ad) 11/25/2015         ROS  Constitutional: negative for fatigue and malaise  Neurological: negative for dizziness/headache  MSK: +elbow discomfort     Physical Exam  Visit Vitals    /89    Pulse 86    Temp 98.6 °F (37 °C) (Oral)    Resp 16    Ht 5' 3\" (1.6 m)    Wt 179 lb (81.2 kg)    SpO2 99%    BMI 31.71 kg/m2       General appearance - Alert, NAD. Head: Atraumatic. Normocephalic. No lymphadenopathy  Respiratory - LCTAB. No wheeze/rale/rhonchi  Heart - Normal rate, regular rhythm. No m/r/r  Musculoskeletal - Normal ROM of all extremities, very minimal swelling over left elbow, nontender, non-erythematous. Assessment/Plan  1. Olecranon bursitis of left elbow: Patient did not follow conservative management that was recommended. Reinforced treatment with naproxen/tylenol. In addition, provided her with exercises for her elbow. Precautions provided to return to care. Follow-up Disposition:  Return if symptoms worsen or fail to improve. I discussed the aforementioned diagnoses with the patient as well as the plan of care.      Pt discussed with Dr. Vinayak Kelly MD  Family Medicine Resident  PGY 3

## 2018-07-02 NOTE — MR AVS SNAPSHOT
[address] 
[phone] Patient: Ghazala Martinez MRN: [id number] Visit Information Date & Time Provider Department Dept. Phone Encounter #  
 7/2/2018  4:10 PM Manuel Gray MD [address] [place] [address] [phone] [id number] Follow-up Instructions Return if symptoms worsen or fail to improve. Your Appointments 7/31/2018 10:40 AM  
ROUTINE CARE with Vivian Garrido MD  
[address] [facility]) Appt Note: f/u from 7/2/18 per Dr. Nano Quezada [address] [place] [address]  
[phone]  
  
   
 [address] [facility] 99 [id number] Upcoming Health Maintenance Date Due Hepatitis C Screening 1960 FOOT EXAM Q1 3/21/1969 EYE EXAM RETINAL OR DILATED Q1 3/21/1969 Pneumococcal 19-64 Medium Risk (1 of 1 - PPSV23) 3/21/1978 DTaP/Tdap/Td series (1 - Tdap) 3/21/1980 PAP AKA CERVICAL CYTOLOGY 3/21/1980 BREAST CANCER SCRN MAMMOGRAM 3/21/2009 FOBT Q 1 YEAR AGE 50-75 3/21/2009 LIPID PANEL Q1 5/5/2016 HEMOGLOBIN A1C Q6M 6/15/2017 MICROALBUMIN Q1 7/15/2017 Influenza Age 5 to Adult 8/1/2018 Allergies as of 7/2/2018  Review Complete On: 7/2/2018 By: Dandre Fields LPN Severity Noted Reaction Type Reactions Compazine [Prochlorperazine]  11/08/2012    Nausea and Vomiting Glimepiride  02/25/2013    Rash Morphine  11/08/2012    Hives Reglan [Metoclopramide Hcl]  11/08/2012    Itching Sulfa (Sulfonamide Antibiotics)  11/08/2012    Nausea Only Vicodin [Hydrocodone-acetaminophen]  11/08/2012    Hives, Itching Current Immunizations  Reviewed on 11/25/2015 Name Date Influenza Vaccine Dasha Miu) 11/25/2015 Not reviewed this visit Vitals BP Pulse Temp Resp Height(growth percentile) Weight(growth percentile) 118/89 86 98.6 °F (37 °C) (Oral) 16 5' 3\" (1.6 m) 179 lb (81.2 kg) SpO2 BMI OB Status Smoking Status 99% 31.71 kg/m2 Ablation Never Smoker Vitals History BMI and BSA Data Body Mass Index Body Surface Area 31.71 kg/m 2 1.9 m 2 Preferred Pharmacy Pharmacy Name Phone [id number], [address] [facility] [phone] Your Updated Medication List  
  
   
This list is accurate as of 7/2/18  4:34 PM.  Always use your most recent med list.  
  
  
  
  
 amiodarone 100 mg tablet Commonly known as:  Sera Mems Take 100 mg by mouth daily. amLODIPine 5 mg tablet Commonly known as:  Cathy Sneedlet DULoxetine 60 mg capsule Commonly known as:  CYMBALTA TAKE 1 CAPSULE DAILY  
  
 glucose blood VI test strips strip Commonly known as:  ONETOUCH ULTRA TEST Test blood glucose twice daily Dx Code: E11.65 JANUVIA 100 mg tablet Generic drug:  SITagliptin TAKE 1 TABLET DAILY LORazepam 0.5 mg tablet Commonly known as:  ATIVAN Take  by mouth. oxybutynin 5 mg tablet Commonly known as:  LUYTJZIO Take 5 mg by mouth three (3) times daily. potassium chloride SR 20 mEq tablet Commonly known as:  K-TAB Take 20 mEq by mouth two (2) times a day. rizatriptan 10 mg disintegrating tablet Commonly known as:  MAXALT-MLT  
DISSOLVE ONE TABLET IN MOUTH ONCE AS NEEDED FOR MIGRAINE FOR 1 DOSE  
  
 warfarin 5 mg tablet Commonly known as:  COUMADIN Take 5 mg by mouth daily. zolpidem 10 mg tablet Commonly known as:  AMBIEN  
TAKE 1 TABLET BY MOUTH EVERY DAY AT BEDTIME AS NEEDED FOR SLEEP Follow-up Instructions Return if symptoms worsen or fail to improve. Patient Instructions Elbow Bursitis: Exercises Your Care Instructions Here are some examples of typical rehabilitation exercises for your condition. Start each exercise slowly.  Ease off the exercise if you start to have pain. Your doctor or physical therapist will tell you when you can start these exercises and which ones will work best for you. How to do the exercises Elbow flexion stretch 1. Lift the arm that bothers you, and bend the elbow. Your palm should face toward you. 2. With your other hand, gently push on the back of your affected forearm. Press your hand toward your shoulder until you feel a stretch in the back of your upper arm. 3. Hold for at least 15 to 30 seconds. 4. Repeat 2 to 4 times. Elbow extension stretch 1. Extend your affected arm in front of you with your palm facing away from you. 2. Bend back your wrist, pointing your hand up toward the ceiling. 3. With your other hand, gently bend your wrist farther until you feel a mild to moderate stretch in your forearm. 4. Hold for at least 15 to 30 seconds. 5. Repeat 2 to 4 times. 6. Repeat steps 1 through 5. But this time extend your affected arm in front of you with your palm facing up. Then bend back your wrist, pointing your hand toward the floor. Pronation and supination stretch 1. Keep your affected elbow at your side, bent at about 90 degrees. Grasp a pen, pencil, or stick, and wrap your hand around it. If you don't have something to hold on to, make a fist instead. 2. Slowly turn your forearm as far as you can back and forth in each direction. Your hand should face up and then down. 3. Hold each position for about 6 seconds. 4. Relax for up to 10 seconds between repetitions. 5. Repeat 8 to 12 times. Hand flips 1. While seated, place your affected forearm on your thigh. Your palm should face down. 2. Flip your hand over so the back of your hand rests on your thigh and your palm is up. Alternate between palm up and palm down while keeping your forearm on your thigh. 3. Repeat 8 to 12 times. Follow-up care is a key part of your treatment and safety.  Be sure to make and go to all appointments, and call your doctor if you are having problems. It's also a good idea to know your test results and keep a list of the medicines you take. Where can you learn more? Go to http://rene-bryon.info/. Enter R642 in the search box to learn more about \"Elbow Bursitis: Exercises. \" Current as of: March 21, 2017 Content Version: 11.4 © 3471-4981 Blaze. Care instructions adapted under license by [employer] (which disclaims liability or warranty for this information). If you have questions about a medical condition or this instruction, always ask your healthcare professional. [address] any warranty or liability for your use of this information. Introducing [address]! [employer] introduces SquareTrade patient portal. Now you can access parts of your medical record, email your doctor's office, and request medication refills online. 1. In your internet browser, go to [email or web address] 2. Click on the First Time User? Click Here link in the Sign In box. You will see the New Member Sign Up page. 3. Enter your SquareTrade Access Code exactly as it appears below. You will not need to use this code after youve completed the sign-up process. If you do not sign up before the expiration date, you must request a new code. · SquareTr[employer] Access Code: [email or web address] Expires: 9/18/2018  3:27 PM 
 
4. Enter the last four digits of your Social Security Number (xxxx) and Date of Birth (mm/dd/yyyy) as indicated and click Submit. You will be taken to the next sign-up page. 5. Create a SquareTrade ID. This will be your SquareTrade login ID and cannot be changed, so think of one that is secure and easy to remember. 6. Create a SquareTrade password. You can change your password at any time. 7. Enter your Password Reset Question and Answer. This can be used at a later time if you forget your password. 8. Enter your e-mail address. You will receive e-mail notification when new information is available in [address] 9. Click Sign Up. You can now view and download portions of your medical record. 10. Click the Download Summary menu link to download a portable copy of your medical information. If you have questions, please visit the Frequently Asked Questions section of the [email or web address] website. Remember, [email or web address] is NOT to be used for urgent needs. For medical emergencies, dial 911. Now available from your iPhone and Android! Please provide this summary of care documentation to your next provider. Your primary care clinician is listed as Adam Plummer. If you have any questions after today's visit, please call [phone].

## 2018-10-04 DIAGNOSIS — L21.9 SEBORRHEA: ICD-10-CM

## 2018-10-04 RX ORDER — CLOBETASOL PROPIONATE 0.46 MG/ML
SOLUTION TOPICAL
Qty: 150 ML | Refills: 0 | Status: SHIPPED | OUTPATIENT
Start: 2018-10-04 | End: 2019-05-03 | Stop reason: ALTCHOICE

## 2018-10-17 DIAGNOSIS — G47.00 INSOMNIA, UNSPECIFIED TYPE: ICD-10-CM

## 2018-10-18 DIAGNOSIS — G47.00 INSOMNIA, UNSPECIFIED TYPE: ICD-10-CM

## 2018-10-22 RX ORDER — ZOLPIDEM TARTRATE 10 MG/1
TABLET ORAL
Qty: 30 TAB | Refills: 2 | Status: SHIPPED | OUTPATIENT
Start: 2018-10-22 | End: 2019-01-04 | Stop reason: SDUPTHER

## 2018-10-22 NOTE — TELEPHONE ENCOUNTER
----- Message from Alyse Reyes sent at 10/22/2018  6:22 PM EDT -----  Regarding: Dr. Joceline Levy stated, she received a message her medication is ready for  at the .  Pt stated, she will be in tomorrow to  medication around 12 PM.   Best contact number [phone]

## 2018-10-22 NOTE — TELEPHONE ENCOUNTER
Dr. Tonja More   Received: Today   Message Contents   [place], [address] [facility]             Pt is requesting for medication Gena Tovar" sent to [address] [facility] [phone]. 4 attempt; no response. Out of medication. Pt best contact [phone].

## 2018-10-23 RX ORDER — ZOLPIDEM TARTRATE 10 MG/1
TABLET ORAL
Qty: 30 TAB | OUTPATIENT
Start: 2018-10-23

## 2018-11-19 DIAGNOSIS — E78.2 MIXED HYPERLIPIDEMIA: ICD-10-CM

## 2018-11-19 DIAGNOSIS — E11.9 TYPE 2 DIABETES MELLITUS WITHOUT COMPLICATION, WITHOUT LONG-TERM CURRENT USE OF INSULIN (HCC): ICD-10-CM

## 2018-11-19 NOTE — TELEPHONE ENCOUNTER
Januvia medication will be out [place]. 7th and she called today and found out it will be March 11th for next apt. Doesn't know what to do about medication unless you refill it and [facility] use [facility].

## 2019-01-04 DIAGNOSIS — G47.00 INSOMNIA, UNSPECIFIED TYPE: ICD-10-CM

## 2019-01-04 NOTE — TELEPHONE ENCOUNTER
----- Message from Gricelda Martell sent at 1/4/2019  2:57 PM EST -----  Regarding: Dr. Leonarod Ambrose requesting a refill on prescription: \"Ambien\". Pt best contact number is [phone].

## 2019-01-08 RX ORDER — ZOLPIDEM TARTRATE 10 MG/1
TABLET ORAL
Qty: 30 TAB | Refills: 2 | Status: SHIPPED | OUTPATIENT
Start: 2019-01-08 | End: 2019-05-02 | Stop reason: SDUPTHER

## 2019-01-08 NOTE — TELEPHONE ENCOUNTER
Rx available for pick-up (#30 x 2 refills). Please let her know she will need an appointment with me to review her insomnia before additional prescriptions will be written.

## 2019-01-09 NOTE — TELEPHONE ENCOUNTER
I called and left a message letting her know that the medication is ready for  here at the office. I indicated that she has 2 additional refills but will need an appt for any additional refills given.

## 2019-02-24 DIAGNOSIS — E11.9 TYPE 2 DIABETES MELLITUS WITHOUT COMPLICATION, WITHOUT LONG-TERM CURRENT USE OF INSULIN (HCC): ICD-10-CM

## 2019-02-24 DIAGNOSIS — E78.2 MIXED HYPERLIPIDEMIA: ICD-10-CM

## 2019-05-02 ENCOUNTER — OFFICE VISIT (OUTPATIENT)
Dept: FAMILY MEDICINE CLINIC | Age: 59
End: 2019-05-02

## 2019-05-02 VITALS
BODY MASS INDEX: 32.96 KG/M2 | RESPIRATION RATE: 20 BRPM | OXYGEN SATURATION: 99 % | HEART RATE: 83 BPM | HEIGHT: 63 IN | SYSTOLIC BLOOD PRESSURE: 103 MMHG | TEMPERATURE: 98.3 F | WEIGHT: 186 LBS | DIASTOLIC BLOOD PRESSURE: 64 MMHG

## 2019-05-02 DIAGNOSIS — Z95.811 LVAD (LEFT VENTRICULAR ASSIST DEVICE) PRESENT (HCC): ICD-10-CM

## 2019-05-02 DIAGNOSIS — G47.00 INSOMNIA, UNSPECIFIED TYPE: Primary | ICD-10-CM

## 2019-05-02 DIAGNOSIS — I42.8 NICM (NONISCHEMIC CARDIOMYOPATHY) (HCC): ICD-10-CM

## 2019-05-02 PROBLEM — F51.01 PRIMARY INSOMNIA: Status: ACTIVE | Noted: 2019-05-02

## 2019-05-02 RX ORDER — ZOLPIDEM TARTRATE 10 MG/1
TABLET ORAL
Qty: 30 TAB | Refills: 2 | Status: SHIPPED | OUTPATIENT
Start: 2019-05-02 | End: 2019-07-19 | Stop reason: SDUPTHER

## 2019-05-02 NOTE — PATIENT INSTRUCTIONS
Insomnia: Care Instructions Your Care Instructions Insomnia is the inability to sleep well. It is a common problem for most people at some time. Insomnia may make it hard for you to get to sleep, stay asleep, or sleep as long as you need to. This can make you tired and grouchy during the day. It can also make you forgetful, less effective at work, and unhappy. Insomnia can be caused by conditions such as depression or anxiety. Pain can also affect your ability to sleep. When these problems are solved, the insomnia usually clears up. But sometimes bad sleep habits can cause insomnia. If insomnia is affecting your work or your enjoyment of life, you can take steps to improve your sleep. Follow-up care is a key part of your treatment and safety. Be sure to make and go to all appointments, and call your doctor if you are having problems. It's also a good idea to know your test results and keep a list of the medicines you take. How can you care for yourself at home? What to avoid · Do not have drinks with caffeine, such as coffee or black tea, for 8 hours before bed. · Do not smoke or use other types of tobacco near bedtime. Nicotine is a stimulant and can keep you awake. · Avoid drinking alcohol late in the evening, because it can cause you to wake in the middle of the night. · Do not eat a big meal close to bedtime. If you are hungry, eat a light snack. · Do not drink a lot of water close to bedtime, because the need to urinate may wake you up during the night. · Do not read or watch TV in bed. Use the bed only for sleeping and sexual activity. What to try · Go to bed at the same time every night, and wake up at the same time every morning. Do not take naps during the day. · Keep your bedroom quiet, dark, and cool. · Sleep on a comfortable pillow and mattress. · If watching the clock makes you anxious, turn it facing away from you so you cannot see the time. · If you worry when you lie down, start a worry book. Well before bedtime, write down your worries, and then set the book and your concerns aside. · Try meditation or other relaxation techniques before you go to bed. · If you cannot fall asleep, get up and go to another room until you feel sleepy. Do something relaxing. Repeat your bedtime routine before you go to bed again. · Make your house quiet and calm about an hour before bedtime. Turn down the lights, turn off the TV, log off the computer, and turn down the volume on music. This can help you relax after a busy day. When should you call for help? Watch closely for changes in your health, and be sure to contact your doctor if: 
  · Your efforts to improve your sleep do not work.  
  · Your insomnia gets worse.  
  · You have been feeling down, depressed, or hopeless or have lost interest in things that you usually enjoy. Where can you learn more? Go to http://rene-bryon.info/. Enter P513 in the search box to learn more about \"Insomnia: Care Instructions. \" Current as of: June 28, 2018 Content Version: 11.9 © 4899-4586 Cegal. Care instructions adapted under license by [employer] (which disclaims liability or warranty for this information). If you have questions about a medical condition or this instruction, always ask your healthcare professional. [address] any warranty or liability for your use of this information. Learning About Sleeping Well What does sleeping well mean? Sleeping well means getting enough sleep. How much sleep is enough varies among people. The number of hours you sleep is not as important as how you feel when you wake up. If you do not feel refreshed, you probably need more sleep. Another sign of not getting enough sleep is feeling tired during the day. The average total nightly sleep time is 7½ to 8 hours.  Healthy adults may need a little more or a little less than this. Why is getting enough sleep important? Getting enough quality sleep is a basic part of good health. When your sleep suffers, your mood and your thoughts can suffer too. You may find yourself feeling more grumpy or stressed. Not getting enough sleep also can lead to serious problems, including injury, accidents, anxiety, and depression. What might cause poor sleeping? Many things can cause sleep problems, including: · Stress. Stress can be caused by fear about a single event, such as giving a speech. Or you may have ongoing stress, such as worry about work or school. · Depression, anxiety, and other mental or emotional conditions. · Changes in your sleep habits or surroundings. This includes changes that happen where you sleep, such as noise, light, or sleeping in a different bed. It also includes changes in your sleep pattern, such as having jet lag or working a late shift. · Health problems, such as pain, breathing problems, and restless legs syndrome. · Lack of regular exercise. How can you help yourself? Here are some tips that may help you sleep more soundly and wake up feeling more refreshed. Your sleeping area · Use your bedroom only for sleeping and sex. A bit of light reading may help you fall asleep. But if it doesn't, do your reading elsewhere in the house. Don't watch TV in bed. · Be sure your bed is big enough to stretch out comfortably, especially if you have a sleep partner. · Keep your bedroom quiet, dark, and cool. Use curtains, blinds, or a sleep mask to block out light. To block out noise, use earplugs, soothing music, or a \"white noise\" machine. Your evening and bedtime routine · Create a relaxing bedtime routine. You might want to take a warm shower or bath, listen to soothing music, or drink a cup of noncaffeinated tea. · Go to bed at the same time every night. And get up at the same time every morning, even if you feel tired. What to avoid · Limit caffeine (coffee, tea, caffeinated sodas) during the day, and don't have any for at least 4 to 6 hours before bedtime. · Don't drink alcohol before bedtime. Alcohol can cause you to wake up more often during the night. · Don't smoke or use tobacco, especially in the evening. Nicotine can keep you awake. · Don't take naps during the day, especially close to bedtime. · Don't lie in bed awake for too long. If you can't fall asleep, or if you wake up in the middle of the night and can't get back to sleep within 15 minutes or so, get out of bed and go to another room until you feel sleepy. · Don't take medicine right before bed that may keep you awake or make you feel hyper or energized. Your doctor can tell you if your medicine may do this and if you can take it earlier in the day. If you can't sleep · Imagine yourself in a peaceful, pleasant scene. Focus on the details and feelings of being in a place that is relaxing. · Get up and do a quiet or boring activity until you feel sleepy. · Don't drink any liquids after 6 p.m. if you wake up often because you have to go to the bathroom. Where can you learn more? Go to http://rene-bryon.info/. Enter E404 in the search box to learn more about \"Learning About Sleeping Well. \" Current as of: September 11, 2018 Content Version: 11.9 © 7666-6483 [employer]. Care instructions adapted under license by [employer] (which disclaims liability or warranty for this information). If you have questions about a medical condition or this instruction, always ask your healthcare professional. [address] any warranty or liability for your use of this information.

## 2019-05-02 NOTE — PROGRESS NOTES
Carli Payen 60 y.o. female 1960 
[facility] [address] [address] 
[id number] [address]: Progress Note Darrel David MD 
  
 
Encounter Date: 5/2/2019 Chief Complaint Patient presents with  Anxiety  
  medication refil History of Present Illness Carli Payne is a 60 y.o. female who presents to clinic today for follow-up on insomnia. States that she has been out for about 1 month. Is getting about 3-4 hours of sleep nightly. Can fall asleep, but is having trouble staying asleep. States that it is stress that keeps her up. These stresses include:  
 
has had an LVAD since 2017. Stress to keep coumadin level under control Couldn't get disability (due to lack of working), however going to see disability  Had tried belsomna, other medications. Mind would race and race. Had been on the 5 mg ambie for some time, but increased to the 10mg. Notes that when she eats with it, it wont work as well. Has had an Irritable mood Is currently on Cymbalta. PHQ-9 and ROYAL-7 completed. See scanned. Review of Systems Review of Systems - Neurological/psychiatric ROS: negative for - insomnia Vitals/Objective:  
 
Vitals:  
 05/02/19 1805 BP: 103/64 Pulse: 83 Resp: 20 Temp: 98.3 °F (36.8 °C) TempSrc: Oral  
SpO2: 99% Weight: 186 lb (84.4 kg) Height: 5' 3\" (1.6 m) Body mass index is 32.95 kg/m². General: Patient alert and oriented and in NAD Heart: Regular rate and rhythm, mechanical hum heard from LVAD. Lungs: Clear to auscultation bilaterally, no wheezing, rales or rhonchi Abd: +BS, non-tender, non-distended Psych: Appropriate mood and affect. Assessment and Plan:  
1. Insomnia, unspecified type Reviewed risk of elevated dose as well as the current recommendation for doses no greater than 5 mg in women. Patient understands these risks and desires to continue at 10mg daily. - zolpidem (AMBIEN) 10 mg tablet; TAKE 1 TABLET BY MOUTH EVERY DAY AT BEDTIME AS NEEDED FOR SLEEP  Dispense: 30 Tab; Refill: 2 2. NICM (nonischemic cardiomyopathy) ([facility] 3. LVAD (left ventricular assist device) present ([facility] Currently followed by Dr. Latoya Lowe. No evidence of acute heart failure at this time. I have discussed the diagnosis with the patient and the intended plan as seen in the above orders. she has expressed understanding. The patient has received an after-visit summary and questions were answered concerning future plans. I have discussed medication side effects and warnings with the patient as well. Electronically Signed: Steffen Badillo MD 
  
History Patients past medical, surgical and family histories were reviewed and updated. Past Medical History:  
Diagnosis Date  CAD (coronary artery disease)  CHF (congestive heart failure) ([facility]  Depression  DM (diabetes mellitus) ([facility]  Heart disease  Neuropathy Past Surgical History:  
Procedure Laterality Date  HX BLADDER SUSPENSION  2006, 2009  HX CHOLECYSTECTOMY  2004  HX PACEMAKER PLACEMENT  2008 Family History Problem Relation Age of Onset  Diabetes Mother  Hypertension Mother  Hypertension Father  Dementia Father  Diabetes Brother  Hypertension Brother Social History Socioeconomic History  Marital status:  Spouse name: Not on file  Number of children: Not on file  Years of education: Not on file  Highest education level: Not on file Occupational History  Not on file Social Needs  Financial resource strain: Not on file  Food insecurity:  
  Worry: Not on file Inability: Not on file  Transportation needs:  
  Medical: Not on file Non-medical: Not on file Tobacco Use  Smoking status: Never Smoker  Smokeless tobacco: Never Used Substance and Sexual Activity  Alcohol use:  No  
  Drug use: No  
 Sexual activity: Yes Lifestyle  Physical activity:  
  Days per week: Not on file Minutes per session: Not on file  Stress: Not on file Relationships  Social connections:  
  Talks on phone: Not on file Gets together: Not on file Attends [employer] service: Not on file Active member of club or organization: Not on file Attends meetings of clubs or organizations: Not on file Relationship status: Not on file  Intimate partner violence:  
  Fear of current or ex partner: Not on file Emotionally abused: Not on file Physically abused: Not on file Forced sexual activity: Not on file Other Topics Concern  Not on file Social History Narrative  Not on file Current Medications/Allergies Current Outpatient Medications Medication Sig Dispense Refill  zolpidem (AMBIEN) 10 mg tablet TAKE 1 TABLET BY MOUTH EVERY DAY AT BEDTIME AS NEEDED FOR SLEEP 30 Tab 2  
 SITagliptin (JANUVIA) 100 mg tablet TAKE 1 TABLET DAILY (NEED TO BE SEEN FOR FURTHER REFILLS) 90 Tab 3  
 amLODIPine (NORVASC) 5 mg tablet  warfarin (COUMADIN) 5 mg tablet Take 5 mg by mouth daily.  oxybutynin (DITROPAN) 5 mg tablet Take 5 mg by mouth three (3) times daily.  DULoxetine (CYMBALTA) 60 mg capsule TAKE 1 CAPSULE DAILY 90 Cap 1  
 glucose blood VI test strips (ONETOUCH ULTRA TEST) strip Test blood glucose twice daily Dx Code: E11.65 100 Strip 11  
 potassium chloride 20 mEq TbER Take 20 mEq by mouth two (2) times a day.  rizatriptan (MAXALT-MLT) 10 mg disintegrating tablet PLACE 1 TABLET ON TONGUE ONCE AS NEEDED FOR MIGRAINE FOR ONE DOSE 12 Tab 0  clobetasol (TEMOVATE) 0.05 % external solution APPLY SOLUTION TOPICALLY ONCE OR TWICE A  mL 0  
 amiodarone (PACERONE) 100 mg tablet Take 100 mg by mouth daily.  LORazepam (ATIVAN) 0.5 mg tablet Take  by mouth. Allergies Allergen Reactions  Compazine [Prochlorperazine] Nausea and Vomiting  Glimepiride Rash  Morphine Hives  Reglan [Metoclopramide Hcl] Itching  Sulfa (Sulfonamide Antibiotics) Nausea Only  Vicodin [Hydrocodone-Acetaminophen] Hives and Itching  
  
__________________________________________________________________________________________ Addendum: 
3 most recent PHQ Screens 5/2/2019 Little interest or pleasure in doing things Not at all Feeling down, depressed, irritable, or hopeless Not at all Total Score PHQ 2 0 Trouble falling or staying asleep, or sleeping too much More than half the days Feeling tired or having little energy More than half the days Poor appetite, weight loss, or overeating Several days Feeling bad about yourself - or that you are a failure or have let yourself or your family down Not at all Trouble concentrating on things such as school, work, reading, or watching TV Not at all Moving or speaking so slowly that other people could have noticed; or the opposite being so fidgety that others notice Not at all Thoughts of being better off dead, or hurting yourself in some way Not at all PHQ 9 Score 5 How difficult have these problems made it for you to do your work, take care of your home and get along with others Not difficult at all ROYAL 2/7 5/6/2019 Feeling nervous, anxious or on edge? 1 Not being able to stop or control worrying? 0 ROYAL-2 Subtotal 1

## 2019-05-30 ENCOUNTER — OFFICE VISIT (OUTPATIENT)
Dept: FAMILY MEDICINE CLINIC | Age: 59
End: 2019-05-30

## 2019-05-30 VITALS
WEIGHT: 181 LBS | DIASTOLIC BLOOD PRESSURE: 70 MMHG | HEIGHT: 63 IN | SYSTOLIC BLOOD PRESSURE: 100 MMHG | RESPIRATION RATE: 20 BRPM | BODY MASS INDEX: 32.07 KG/M2 | OXYGEN SATURATION: 97 % | HEART RATE: 80 BPM | TEMPERATURE: 97.8 F

## 2019-05-30 DIAGNOSIS — M25.552 LEFT HIP PAIN: Primary | ICD-10-CM

## 2019-05-30 NOTE — PROGRESS NOTES
Chief Complaint   Patient presents with    Leg Pain     Left Leg Pain      1. Have you been to the ER, urgent care clinic since your last visit? Hospitalized since your last visit? No    2. Have you seen or consulted any other health care providers outside of the [address] [place] [address] since your last visit? Include any pap smears or colon screening.  Yes Cardiology Dr. Donaldo Wallace

## 2019-05-30 NOTE — PROGRESS NOTES
Madeleine Sheth is a 60 y.o. female who presents for left hip pain. Patient notes that 7 days ago she was walking down some steps when her left foot slipped out from under her on the last step. She notes that she did not fall, as she was able to catch herself on the armrest prior to hitting the ground. After that event she began to have pain along the outside of the left hip, along with some tightness in the lower back, and some radiation of pain from the lateral hip down to the outside of the knee. Since that time, the pateint has been taking some advil and tramadol, with minimal relief of the pain. No pain in the anterior hip. No numbness, tingling, or weakness in the legs. No pain past the knee. PMHx:  Past Medical History:   Diagnosis Date    CAD (coronary artery disease)     [facility]     Depression     [facility]     Heart disease     Neuropathy        Meds:   Current Outpatient Medications   Medication Sig Dispense Refill    zolpidem (AMBIEN) 10 mg tablet TAKE 1 TABLET BY MOUTH EVERY DAY AT BEDTIME AS NEEDED FOR SLEEP 30 Tab 2    SITagliptin (JANUVIA) 100 mg tablet TAKE 1 TABLET DAILY (NEED TO BE SEEN FOR FURTHER REFILLS) 90 Tab 3    amLODIPine (NORVASC) 5 mg tablet       warfarin (COUMADIN) 5 mg tablet Take 5 mg by mouth daily.  oxybutynin (DITROPAN) 5 mg tablet Take 5 mg by mouth three (3) times daily.  DULoxetine (CYMBALTA) 60 mg capsule TAKE 1 CAPSULE DAILY 90 Cap 1    glucose blood VI test strips (ONETOUCH ULTRA TEST) strip Test blood glucose twice daily Dx Code: E11.65 100 Strip 11    potassium chloride 20 mEq TbER Take 20 mEq by mouth two (2) times a day. Allergies:    Allergies   Allergen Reactions    Compazine [Prochlorperazine] Nausea and Vomiting    Glimepiride Rash    Morphine Hives    Reglan [Metoclopramide Hcl] Itching    Sulfa (Sulfonamide Antibiotics) Nausea Only    Vicodin [Hydrocodone-Acetaminophen] Hives and Itching       Smoker:  Social History     Tobacco Use   Smoking Status Never Smoker   Smokeless Tobacco Never Used       ETOH:   Social History     Substance and Sexual Activity   Alcohol Use No       FH:   Family History   Problem Relation Age of Onset    Diabetes Mother     Hypertension Mother     Hypertension Father     Dementia Father     Diabetes Brother     Hypertension Brother        ROS:  General/Constitutional:  No headache, fever, fatigue  Eyes:  No redness, pruritis, pain, visual changes  Ears:  No pain, loss or changes in hearing  Neck:  No swelling, masses, stiffness, pain, or limited movement  Cardiac:   No chest pain, palpitations  Respiratory:  No cough or shortness of breath    GI:  No nausea/vomiting, diarrhea, abdominal pain, bloody or dark stools         Physical Exam:  Visit Vitals  /70 (BP 1 Location: Left arm, BP Patient Position: Sitting)   Pulse 80   Temp 97.8 °F (36.6 °C) (Oral)   Resp 20   Ht 5' 3\" (1.6 m)   Wt 181 lb (82.1 kg)   SpO2 97%   BMI 32.06 kg/m²     Gen: NAD. Responds to all questions appropriately. Lungs: No labored respirations. Skin: No obvious rash  Ext: Well perfused. No edema.   MSK - Hip left:    Deformity: None    ROM:     Flexion: Normal    Extension: Normal     Internal/external rotation: Normal      Gait: Painful on the left hip with weightbearing; attempts to minimize weightbearing on left     Palpation:    L1-L5: No tenderness    Sacrum: No tenderness    Coccyx: No tenderness    Left Paraspinal: No tenderness    Right Paraspinal: No tenderness    Greater trochanter: tenderness    Ischial Tuberosity: No tenderness    Piriformis: No tenderness       Strength (0-5/5)    Hip Flexion:   Left: 5/5  Right: 5/5    Hip Extension:  Left: 5/5  Right: 5/5    Hip Abduction:  Left: 5/5  Right: 5/5    Hip Adduction:  Left: 5/5  Right: 5/5    Knee Extension:  Left: 5/5  Right: 5/5    Knee Flexion:   Left: 5/5  Right: 5/5         Sensation: Intact, no deficits Special test:    Straight leg: Left: Negative  Right: Negative    Lucies: Left: Negative  Right: Negative    Imaging: Radiographs of the left hip were performed and personally reviewed by me, with the following notations: No acute fracture or dislocation noted    Assessment:    ICD-10-CM ICD-9-CM    1. Left hip pain M25.552 719.45 XR HIP LT W OR WO PELV 2-3 VWS     Patient presenting with left hip pain after a near fall, that appears to be muscular in nature. Discussed treatment options centering around PT for treatments and ROM/strengthening, which patient is agreeable with. Will hold on injection at this time but will consider if not improving. Plan:  1. Home Exercise Program as per handout. Referred to PT. 2. Ice 15 minutes, three times a day PRN and after exercise. Can alternate with heat for 15 minutes. Medications:    1. Naproxin (Aleve): 220mg 1-2 tablets twice a day PRN. 2. Acetaminophen (Tylenol):  500mg 1-2 tablets every 6 hours as needed for pain. RTC: 4 weeks if not improving.

## 2019-07-22 ENCOUNTER — TELEPHONE (OUTPATIENT)
Dept: FAMILY MEDICINE CLINIC | Age: 59
End: 2019-07-22

## 2019-07-22 NOTE — TELEPHONE ENCOUNTER
Patient calling to inquire if we had a cat scan on file for her back from visit with Dr Corrinne Harries. Informed patient our records show xray relating to her hip and no cat scan. She states she think [facility] did the scan of her back and she will contact them.

## 2019-09-19 DIAGNOSIS — L21.9 SEBORRHEA: ICD-10-CM

## 2019-09-19 RX ORDER — CLOBETASOL PROPIONATE 0.46 MG/ML
SOLUTION TOPICAL
Qty: 150 ML | Refills: 0 | OUTPATIENT
Start: 2019-09-19

## 2019-09-19 NOTE — TELEPHONE ENCOUNTER
----- Message from Consepcion Collet sent at 9/19/2019 11:17 AM EDT -----  Regarding: juan carlos DAVIS/ telephone  Medication Refill    Caller (if not patient): [facility]      Relationship of caller (if not patient): Pharmacy tech      Best contact number(s): contact [phone] ref # [phone]      Name of medication and dosage if known: CLOBETASOL SOLUTION . 05%      Is patient out of this medication (yes/no): yes      Pharmacy name: [facility]    Pharmacy listed in chart? (yes/no):  Yes    Details to clarify the request: Original prescriber was Belinda Manning

## 2019-09-30 ENCOUNTER — TELEPHONE (OUTPATIENT)
Dept: ENDOCRINOLOGY | Age: 59
End: 2019-09-30

## 2019-09-30 DIAGNOSIS — E78.2 MIXED HYPERLIPIDEMIA: ICD-10-CM

## 2019-09-30 DIAGNOSIS — E11.9 TYPE 2 DIABETES MELLITUS WITHOUT COMPLICATION, WITHOUT LONG-TERM CURRENT USE OF INSULIN (HCC): ICD-10-CM

## 2019-09-30 NOTE — TELEPHONE ENCOUNTER
Please call [facility] for Januvia RX refill 90 day supply. [phone] Patient also needs 30 day supply to [facility].

## 2019-10-03 ENCOUNTER — OFFICE VISIT (OUTPATIENT)
Dept: ENDOCRINOLOGY | Age: 59
End: 2019-10-03

## 2019-10-03 VITALS
OXYGEN SATURATION: 96 % | TEMPERATURE: 98 F | BODY MASS INDEX: 29.41 KG/M2 | HEIGHT: 63 IN | WEIGHT: 166 LBS | DIASTOLIC BLOOD PRESSURE: 73 MMHG | HEART RATE: 63 BPM | SYSTOLIC BLOOD PRESSURE: 86 MMHG | RESPIRATION RATE: 14 BRPM

## 2019-10-03 DIAGNOSIS — E78.2 MIXED HYPERLIPIDEMIA: ICD-10-CM

## 2019-10-03 DIAGNOSIS — G62.9 POLYNEUROPATHY, UNSPECIFIED: ICD-10-CM

## 2019-10-03 DIAGNOSIS — E11.65 TYPE 2 DIABETES MELLITUS WITH HYPERGLYCEMIA, WITHOUT LONG-TERM CURRENT USE OF INSULIN (HCC): Primary | ICD-10-CM

## 2019-10-03 DIAGNOSIS — E11.9 TYPE 2 DIABETES MELLITUS WITHOUT COMPLICATION, WITHOUT LONG-TERM CURRENT USE OF INSULIN (HCC): ICD-10-CM

## 2019-10-03 LAB
GLUCOSE POC: 166 MG/DL
HBA1C MFR BLD HPLC: 6.1 %

## 2019-10-03 RX ORDER — LOVASTATIN 20 MG/1
20 TABLET ORAL
Qty: 90 TAB | Refills: 3 | Status: SHIPPED | OUTPATIENT
Start: 2019-10-03

## 2019-10-03 RX ORDER — CLOBETASOL PROPIONATE 0.46 MG/ML
SOLUTION TOPICAL
Qty: 150 ML | Refills: 0 | Status: SHIPPED | OUTPATIENT
Start: 2019-10-03 | End: 2020-03-04

## 2019-10-03 RX ORDER — LOSARTAN POTASSIUM 25 MG/1
25 TABLET ORAL DAILY
COMMUNITY

## 2019-10-03 NOTE — PROGRESS NOTES
Chief Complaint   Patient presents with    Diabetes     LV 2016    Diabetic Foot Exam       History of Present Illness: Jameson Wilson is a 60 y.o. female here for reestablishment of the care. She was seen more than 3 years ago. Type 2 diabetes mellitus, on Januvia. She is running out of the prescription. Nonischemic cardiomyopathy, has AICD, LVAD  Complains of shortness of breath, weakness, lost weight. When she was on prednisone gained significant amount of weight. Could not tolerate metformin: Reports leg pain    Brother  from DM - post transplant       Seen Neurologist,rheumatologist for neuropathy. Could not tolerate gabapentin, Lyrica in the past  Complains of tingling and numbness in the feet    Type 2 Diabetes was diagnosed in 10/2012   Cardiovascular risk factors: family history, CHF     No blurred vision    Wt Readings from Last 3 Encounters:   10/03/19 166 lb (75.3 kg)   19 181 lb (82.1 kg)   19 186 lb (84.4 kg)       BP Readings from Last 3 Encounters:   10/03/19 (!) 86/73   19 100/70   19 103/64       Past Medical History:   Diagnosis Date    CAD (coronary artery disease)     [facility]     Depression     [facility]     Heart disease     Neuropathy      Current Outpatient Medications   Medication Sig    clobetasol (TEMOVATE) 0.05 % external solution APPLY SOLUTION TOPICALLY ONCE OR TWICE A DAY    losartan (COZAAR) 25 mg tablet Take 25 mg by mouth daily.  SITagliptin (JANUVIA) 100 mg tablet TAKE 1 TABLET DAILY (NEED TO BE SEEN FOR FURTHER REFILLS)    zolpidem (AMBIEN) 10 mg tablet TAKE 1 TABLET BY MOUTH EVERY DAY AT BEDTIME AS NEEDED FOR SLEEP    amLODIPine (NORVASC) 5 mg tablet Take 2.5 mg by mouth daily.  warfarin (COUMADIN) 5 mg tablet Take 5 mg by mouth daily.  oxybutynin (DITROPAN) 5 mg tablet Take 5 mg by mouth daily.     DULoxetine (CYMBALTA) 60 mg capsule TAKE 1 CAPSULE DAILY (Patient taking differently: Take 90 mg by mouth daily.)    glucose blood VI test strips (ONETOUCH ULTRA TEST) strip Test blood glucose twice daily Dx Code: E11.65    potassium chloride 20 mEq TbER Take 20 mEq by mouth daily. No current facility-administered medications for this visit. Allergies   Allergen Reactions    Compazine [Prochlorperazine] Nausea and Vomiting    Metoclopramide Itching    Morphine Hives and Itching    Oxycodone-Acetaminophen Itching    Reglan [Metoclopramide Hcl] Itching    Sulfa (Sulfonamide Antibiotics) Nausea Only    Vicodin [Hydrocodone-Acetaminophen] Hives and Itching    Glimepiride Rash         Review of Systems:  -   - Review of all symptoms negative other than mentioned in HPI  -     Physical Examination:   Blood pressure (!) 86/73, pulse 63, temperature 98 °F (36.7 °C), temperature source Oral, resp. rate 14, height 5' 3\" (1.6 m), weight 166 lb (75.3 kg), SpO2 96 %. Estimated body mass index is 29.41 kg/m² as calculated from the following:    Height as of this encounter: 5' 3\" (1.6 m). -   Weight as of this encounter: 166 lb (75.3 kg).   - General: pleasant, no distress, good eye contact  - HEENT: no pallor, no periorbital edema, EOMI  - Neck: supple, no thyromegaly  - Cardiovascular: regular, normal rate, normal S1 and S2  - Respiratory: clear to auscultation bilaterally  - Gastrointestinal: soft, nontender, nondistended,  BS +  - Extremties- no edema, .  - Neurological: alert and oriented  - Psychiatric: normal mood and affect  - Skin: color, texture, turgor normal.     Diabetic foot exam: October 2019    Left:     Vibratory sensation absent   Filament test decreased sensation with micro filament   Pulse DP: 1+    Deformities: Bunion  Right:    Vibratory sensation absent   Filament test decreased sensation with micro filament   Pulse DP: 1+   Deformities: Bunion, hammertoe    Bunion - hammer toe     Data Reviewed:       Lab Results   Component Value Date/Time    Hemoglobin A1c 7.3 (H) 07/15/2016 09:05 AM    Hemoglobin A1c 6.5 (H) 05/05/2015 09:46 AM    Hemoglobin A1c 6.1 (H) 01/28/2015 09:30 AM    Glucose 127 (H) 07/15/2016 09:05 AM    Glucose  10/03/2019 01:45 PM    Microalb/Creat ratio (ug/mg creat.) 3.4 05/05/2015 09:46 AM    LDL,Direct 94 07/15/2016 09:05 AM    LDL, calculated 64 05/05/2015 09:46 AM    Creatinine 0.79 07/15/2016 09:05 AM      Lab Results   Component Value Date/Time    Cholesterol, total 159 05/05/2015 09:46 AM    HDL Cholesterol 69 05/05/2015 09:46 AM    LDL,Direct 94 07/15/2016 09:05 AM    LDL, calculated 64 05/05/2015 09:46 AM    Triglyceride 132 05/05/2015 09:46 AM     Lab Results   Component Value Date/Time    GFR est AA 96 07/15/2016 09:05 AM    GFR est non-AA 83 07/15/2016 09:05 AM    Creatinine 0.79 07/15/2016 09:05 AM    BUN 14 07/15/2016 09:05 AM    Sodium 141 07/15/2016 09:05 AM    Potassium 5.4 (H) 07/15/2016 09:05 AM    Chloride 100 07/15/2016 09:05 AM    CO2 24 07/15/2016 09:05 AM      Lab Results   Component Value Date/Time    TSH 1.250 03/20/2013 11:09 AM      Lab Results   Component Value Date/Time    Glucose 127 (H) 07/15/2016 09:05 AM    Glucose  10/03/2019 01:45 PM      Lab Results   Component Value Date/Time    Cholesterol, total 159 05/05/2015 09:46 AM    HDL Cholesterol 69 05/05/2015 09:46 AM    LDL,Direct 94 07/15/2016 09:05 AM    LDL, calculated 64 05/05/2015 09:46 AM    Triglyceride 132 05/05/2015 09:46 AM     Assessment/Plan:     1. Type 2 Diabetes Mellitus ,neuropathy,  Lab Results   Component Value Date/Time    Hemoglobin A1c 7.3 (H) 07/15/2016 09:05 AM    Hemoglobin A1c (POC) 7.5 12/15/2016 04:23 PM     Januvia   Frequent yeast infections - so no SGLT2 inhibitors    2. Peripheral neuropathy/RLE hammer toe/carpel tunnel syndrome  Extensive work up for neuropathy by neurologist.  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 3. Hx NICM likely secondary to myocarditis. AICD  LVAD    4. Dyslipidemia :statin intolerance, willing to try again, requesting the cheapest and the one she can tolerate. Thank you for allowing me to participate in the care of this patient.     Anmol Nickerson MD      Patient verbalized understanding

## 2019-10-03 NOTE — PROGRESS NOTES
Ama Beltran is a 60 y.o. female here for   Chief Complaint   Patient presents with    Diabetes     LV 2016    Diabetic Foot Exam       Functional glucose monitor and record keeping system? -yes   Eye exam within last year? - [facility]  Foot exam within last year? - due    1. Have you been to the ER, urgent care clinic since your last visit? Hospitalized since your last visit? -[facility] for fall and cracking leg 7/31/19    2. Have you seen or consulted any other health care providers outside of the [address] [place] [address] since your last visit? Include any pap smears or colon screening. -PCP

## 2019-10-03 NOTE — LETTER
10/4/19 Patient: Georgette Calzada YOB: 1960 Date of Visit: 10/3/2019 Tammy Morales NP 
[address] [address] [place] [address] VIA In Basket Dear Tammy Morales NP, Thank you for referring Ms. Ginna Elliott to [address] [address] for evaluation. My notes for this consultation are attached. If you have questions, please do not hesitate to call me. I look forward to following your patient along with you. Sincerely, Inna Arnold MD

## 2019-10-04 PROBLEM — G62.9 POLYNEUROPATHY, UNSPECIFIED: Status: ACTIVE | Noted: 2019-10-04

## 2019-10-04 LAB
ALBUMIN/CREAT UR: 16 MG/G CREAT (ref 0–30)
BUN SERPL-MCNC: 13 MG/DL (ref 8–27)
BUN/CREAT SERPL: 17 (ref 12–28)
CALCIUM SERPL-MCNC: 9.3 MG/DL (ref 8.7–10.3)
CHLORIDE SERPL-SCNC: 98 MMOL/L (ref 96–106)
CO2 SERPL-SCNC: 24 MMOL/L (ref 20–29)
CREAT SERPL-MCNC: 0.76 MG/DL (ref 0.57–1)
CREAT UR-MCNC: 117.7 MG/DL
GLUCOSE SERPL-MCNC: 146 MG/DL (ref 65–99)
LDLC SERPL DIRECT ASSAY-MCNC: 136 MG/DL (ref 0–99)
MICROALBUMIN UR-MCNC: 18.8 UG/ML
POTASSIUM SERPL-SCNC: 4.2 MMOL/L (ref 3.5–5.2)
SODIUM SERPL-SCNC: 138 MMOL/L (ref 134–144)

## 2019-10-07 ENCOUNTER — TELEPHONE (OUTPATIENT)
Dept: ENDOCRINOLOGY | Age: 59
End: 2019-10-07

## 2019-10-07 NOTE — TELEPHONE ENCOUNTER
Informed pt that Kidney function is good. Cholesterol is high, cholesterol medication has been sent to the pharmacy. Pt verbalized understanding.

## 2019-10-19 DIAGNOSIS — G47.00 INSOMNIA, UNSPECIFIED TYPE: ICD-10-CM

## 2019-10-20 RX ORDER — ZOLPIDEM TARTRATE 10 MG/1
TABLET ORAL
Qty: 30 TAB | Refills: 2 | Status: SHIPPED | OUTPATIENT
Start: 2019-10-20 | End: 2020-01-26

## 2019-10-22 NOTE — TELEPHONE ENCOUNTER
Left voice mail script is at our practice for  per the nurse.    (It is at the )     Dr. Fatuma Roebrts   Received: [facility] Message/Vendor Calls     Caller's first and last name:       Reason for call:  Pt is at her local pharmacy listed and they claim they never received the Rx for the zolpidem (AMBIEN) 10 mg tablet [[id number]], please resend.             Callback required yes/no and why:   Y   Best contact number(s): [phone]       Details to clarify the request:       Rissa Negrete

## 2020-01-23 DIAGNOSIS — G47.00 INSOMNIA, UNSPECIFIED TYPE: ICD-10-CM

## 2020-01-26 RX ORDER — ZOLPIDEM TARTRATE 10 MG/1
TABLET ORAL
Qty: 30 TAB | Refills: 2 | Status: SHIPPED | OUTPATIENT
Start: 2020-01-26 | End: 2020-04-22 | Stop reason: SDUPTHER

## 2020-02-03 ENCOUNTER — TELEPHONE (OUTPATIENT)
Dept: ENDOCRINOLOGY | Age: 60
End: 2020-02-03

## 2020-02-03 DIAGNOSIS — E11.65 TYPE 2 DIABETES MELLITUS WITH HYPERGLYCEMIA, WITHOUT LONG-TERM CURRENT USE OF INSULIN (HCC): Primary | ICD-10-CM

## 2020-02-25 DIAGNOSIS — E11.9 TYPE 2 DIABETES MELLITUS WITHOUT COMPLICATION, WITHOUT LONG-TERM CURRENT USE OF INSULIN (HCC): ICD-10-CM

## 2020-02-25 DIAGNOSIS — E78.2 MIXED HYPERLIPIDEMIA: ICD-10-CM

## 2020-03-04 ENCOUNTER — OFFICE VISIT (OUTPATIENT)
Dept: ENDOCRINOLOGY | Age: 60
End: 2020-03-04

## 2020-03-04 VITALS
BODY MASS INDEX: 31.54 KG/M2 | RESPIRATION RATE: 18 BRPM | HEIGHT: 63 IN | TEMPERATURE: 97.1 F | WEIGHT: 178 LBS | OXYGEN SATURATION: 94 %

## 2020-03-04 DIAGNOSIS — E11.9 TYPE 2 DIABETES MELLITUS WITHOUT COMPLICATION, WITHOUT LONG-TERM CURRENT USE OF INSULIN (HCC): ICD-10-CM

## 2020-03-04 DIAGNOSIS — G62.9 POLYNEUROPATHY, UNSPECIFIED: ICD-10-CM

## 2020-03-04 DIAGNOSIS — E78.2 MIXED HYPERLIPIDEMIA: ICD-10-CM

## 2020-03-04 DIAGNOSIS — E11.65 TYPE 2 DIABETES MELLITUS WITH HYPERGLYCEMIA, WITHOUT LONG-TERM CURRENT USE OF INSULIN (HCC): Primary | ICD-10-CM

## 2020-03-04 LAB
GLUCOSE POC: 280 MG/DL
HBA1C MFR BLD HPLC: 7.1 %

## 2020-03-04 RX ORDER — HYDRALAZINE HYDROCHLORIDE 25 MG/1
25 TABLET, FILM COATED ORAL 3 TIMES DAILY
COMMUNITY

## 2020-03-04 RX ORDER — LORAZEPAM 1 MG/1
1 TABLET ORAL 2 TIMES DAILY
COMMUNITY

## 2020-03-04 NOTE — PROGRESS NOTES
Campbell Cowan is a 60 y.o. female here for   Chief Complaint   Patient presents with    Diabetes       1. Have you been to the ER, urgent care clinic since your last visit? Hospitalized since your last visit? -[facility] a couple weeks ago for infection    2. Have you seen or consulted any other health care providers outside of the [address] [place] [address] since your last visit? Include any pap smears or colon screening. -PCP , cardiology and urology

## 2020-03-04 NOTE — PROGRESS NOTES
Chief Complaint   Patient presents with    Diabetes       History of Present Illness: Cammy Slaughter is a 60 y.o. female here for follow-up. Type 2 diabetes mellitus, on Januvia. Nonischemic cardiomyopathy, has AICD, LVAD  Shortness of breath on exertion  She is on antibiotics intermittently complicated by yeast infection  Could not tolerate metformin: Reports leg pain    Brother  from DM - post transplant       Seen Neurologist,rheumatologist for neuropathy. Could not tolerate gabapentin, Lyrica in the past  Complains of tingling and numbness in the feet    Type 2 Diabetes was diagnosed in 10/2012   Cardiovascular risk factors: family history, CHF     No blurred vision    Wt Readings from Last 3 Encounters:   20 178 lb (80.7 kg)   10/03/19 166 lb (75.3 kg)   19 181 lb (82.1 kg)       BP Readings from Last 3 Encounters:   10/03/19 (!) 86/73   19 100/70   19 103/64       Past Medical History:   Diagnosis Date    CAD (coronary artery disease)     [facility]     Depression     [facility]     Heart disease     Neuropathy      Current Outpatient Medications   Medication Sig    LORazepam (ATIVAN) 1 mg tablet Take 1 mg by mouth two (2) times a day.  SITagliptin (JANUVIA) 100 mg tablet TAKE 1 TABLET DAILY    zolpidem (AMBIEN) 10 mg tablet TAKE 1 TABLET BY MOUTH EVERY DAY AT BEDTIME AS NEEDED FOR SLEEP    losartan (COZAAR) 25 mg tablet Take 25 mg by mouth daily.  lovastatin (MEVACOR) 20 mg tablet Take 1 Tab by mouth nightly.  amLODIPine (NORVASC) 5 mg tablet Take 2.5 mg by mouth daily.  warfarin (COUMADIN) 5 mg tablet Depending on INR    oxybutynin (DITROPAN) 5 mg tablet Take 5 mg by mouth daily.  glucose blood VI test strips (ONETOUCH ULTRA TEST) strip Test blood glucose twice daily Dx Code: E11.65    potassium chloride 20 mEq TbER Take 20 mEq by mouth daily. No current facility-administered medications for this visit. Allergies   Allergen Reactions    Compazine [Prochlorperazine] Nausea and Vomiting    Metoclopramide Itching    Morphine Hives and Itching    Oxycodone-Acetaminophen Itching    Reglan [Metoclopramide Hcl] Itching    Sulfa (Sulfonamide Antibiotics) Nausea Only    Vicodin [Hydrocodone-Acetaminophen] Hives and Itching    Glimepiride Rash         Review of Systems:  -   - Review of all symptoms negative other than mentioned in HPI  -     Physical Examination:   Temperature 97.1 °F (36.2 °C), temperature source Oral, resp. rate 18, height 5' 3\" (1.6 m), weight 178 lb (80.7 kg), SpO2 94 %. Estimated body mass index is 31.53 kg/m² as calculated from the following:    Height as of this encounter: 5' 3\" (1.6 m). -   Weight as of this encounter: 178 lb (80.7 kg).   - General: pleasant, no distress, good eye contact  - HEENT: no pallor, no periorbital edema, EOMI  - Neck: supple, no thyromegaly  - Cardiovascular: regular, normal rate, normal S1 and S2  - Respiratory: clear to auscultation bilaterally  - Gastrointestinal: soft, nontender, nondistended,  BS +  - Extremties- no edema, .  - Neurological: alert and oriented  - Psychiatric: normal mood and affect  - Skin: color, texture, turgor normal.     Diabetic foot exam: October 2019    Left:     Vibratory sensation absent   Filament test decreased sensation with micro filament   Pulse DP: 1+    Deformities: Bunion  Right:    Vibratory sensation absent   Filament test decreased sensation with micro filament   Pulse DP: 1+   Deformities: Bunion, hammertoe    Bunion - hammer toe     Data Reviewed:       Lab Results   Component Value Date/Time    Hemoglobin A1c 7.3 (H) 07/15/2016 09:05 AM    Hemoglobin A1c 6.5 (H) 05/05/2015 09:46 AM    Hemoglobin A1c 6.1 (H) 01/28/2015 09:30 AM    Glucose 146 (H) 10/03/2019 02:45 PM    Glucose  03/04/2020 02:23 PM    Microalb/Creat ratio (ug/mg creat.) 16.0 10/03/2019 02:45 PM    LDL,Direct 136 (H) 10/03/2019 02:45 PM    LDL, calculated 64 05/05/2015 09:46 AM    Creatinine 0.76 10/03/2019 02:45 PM      Lab Results   Component Value Date/Time    Cholesterol, total 159 05/05/2015 09:46 AM    HDL Cholesterol 69 05/05/2015 09:46 AM    LDL,Direct 136 (H) 10/03/2019 02:45 PM    LDL, calculated 64 05/05/2015 09:46 AM    Triglyceride 132 05/05/2015 09:46 AM     Lab Results   Component Value Date/Time    GFR est AA 99 10/03/2019 02:45 PM    GFR est non-AA 86 10/03/2019 02:45 PM    Creatinine 0.76 10/03/2019 02:45 PM    BUN 13 10/03/2019 02:45 PM    Sodium 138 10/03/2019 02:45 PM    Potassium 4.2 10/03/2019 02:45 PM    Chloride 98 10/03/2019 02:45 PM    CO2 24 10/03/2019 02:45 PM      Lab Results   Component Value Date/Time    TSH 1.250 03/20/2013 11:09 AM      Lab Results   Component Value Date/Time    Glucose 146 (H) 10/03/2019 02:45 PM    Glucose  03/04/2020 02:23 PM      Lab Results   Component Value Date/Time    Cholesterol, total 159 05/05/2015 09:46 AM    HDL Cholesterol 69 05/05/2015 09:46 AM    LDL,Direct 136 (H) 10/03/2019 02:45 PM    LDL, calculated 64 05/05/2015 09:46 AM    Triglyceride 132 05/05/2015 09:46 AM     Assessment/Plan:     1. Type 2 Diabetes Mellitus ,neuropathy,  Lab Results   Component Value Date/Time    Hemoglobin A1c 7.3 (H) 07/15/2016 09:05 AM    Hemoglobin A1c (POC) 7.1 03/04/2020 02:23 PM     Januvia   Frequent yeast infections, however most of them are triggered by antibiotic usage- so no SGLT2 inhibitors    2. Peripheral neuropathy/RLE hammer toe/carpel tunnel syndrome  Extensive work up for neuropathy by neurologist.  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 3. Hx NICM likely secondary to myocarditis. AICD  LVAD    4. Dyslipidemia :statin intolerance, tolerating Mevacor              Thank you for allowing me to participate in the care of this patient.     Mary To MD      Patient verbalized understanding

## 2020-04-22 DIAGNOSIS — G47.00 INSOMNIA, UNSPECIFIED TYPE: ICD-10-CM

## 2020-04-22 NOTE — TELEPHONE ENCOUNTER
Dr. Grisel Estrada   Received: Today   Message Contents   Freddy Duncan [facility] Front Office             Caller's first and last name: Michaelle Giordano   Reason for call:   f/up on refill request for generic of Juan J Machuca that was made yesterday. Pt wants to know if she has to make an appt to get it refilled or not. Callback required yes/no and why: yes   Best contact number(s):  [phone]   Details to clarify the request:      Do not see any previous request for this refill coming in this week. Also she did not specify for this to go to the local pharmacy or mail order.

## 2020-04-24 RX ORDER — ZOLPIDEM TARTRATE 10 MG/1
TABLET ORAL
Qty: 30 TAB | Refills: 2 | Status: SHIPPED | OUTPATIENT
Start: 2020-04-24 | End: 2020-09-17 | Stop reason: SDUPTHER

## 2020-04-24 NOTE — TELEPHONE ENCOUNTER
En[facility] message of 3:00 pm of today. Pierce Gayle MD   Received:  Today   Message Contents   [facility] Front Office             General Message/Vendor Calls     Caller's first and last name:     Reason for call: RX fill follow uup request     Callback required yes/no and why: Yes     Best contact number(s): [phone]     Details to clarify the request: pt requesting update of rx fill request zolpidem (AMBIEN) 10 mg tablet [facility]/pharmacy #[id number] [id number] [facility]

## 2020-04-24 NOTE — TELEPHONE ENCOUNTER
[phone]    Patient called for update and informed pending. Took last pill on 4/22 when the request come to the office.     [facility]/PHARMACY #[id number]- [place]

## 2020-04-24 NOTE — TELEPHONE ENCOUNTER
Clarified patient request.  LOV 5/2019. Will provide refill, but would like patient to schedule appointment before he last refill is up for her annual visit.

## 2020-04-29 ENCOUNTER — TELEPHONE (OUTPATIENT)
Dept: FAMILY MEDICINE CLINIC | Age: 60
End: 2020-04-29

## 2020-04-29 NOTE — TELEPHONE ENCOUNTER
----- Message from Dami López sent at 2020  2:59 PM EDT -----  Regarding: Dr. Marlo Roberts / Yoselin Carrizales Message/Vendor Calls    To: [facility]  Subject:  Dr. Marlo Roberts / Telephone  Patient's first and last name: Paige Coronado  : 1960  ID numbers:  #[id number]    Caller's first and last name: N/A  Reason for call: Pt is calling to check on status of prescription refill request for Ambien. Pt say this is her third attempt. Please call asap.   Best contact number(s): [phone]  Details to clarify the request: N/A

## 2020-09-17 DIAGNOSIS — G47.00 INSOMNIA, UNSPECIFIED TYPE: ICD-10-CM

## 2020-09-18 RX ORDER — ZOLPIDEM TARTRATE 10 MG/1
TABLET ORAL
Qty: 30 TAB | Refills: 0 | Status: SHIPPED | OUTPATIENT
Start: 2020-09-18

## 2020-09-18 NOTE — TELEPHONE ENCOUNTER
I called and spoke with patient and let her know to call back here at the clinic to schedule a VV for future refills. She verbalized understanding.

## 2020-09-18 NOTE — TELEPHONE ENCOUNTER
Dr Rice/ refill   Received: 2 days ago   Message Contents   Kameron, [address] (if not patient): Self   Relationship of caller (if not patient): Self   Best contact number(s): [phone]   Name of medication and dosage if known: Ambien  doesn't know MG   Is patient out of this medication (yes/no):  Yes   Pharmacy name: [facility] [address] listed in chart? (yes/no): Yes   Pharmacy phone number: [phone]   Date of last visit: 1/28/2020   Details to clarify the request: Pt is requesting a refill on the Ambien. Patient was not home so she did not know the dosage for it. Pt is out of medication and is having a hard time falling asleep.

## 2020-09-18 NOTE — TELEPHONE ENCOUNTER
Will provide temporary supply. Patient was notified in April that she needed to schedule office visit (may be virtual) however still not done so. I cannot provide additional prescriptions for this patient until she is seen.

## 2020-12-03 NOTE — TELEPHONE ENCOUNTER
I called and left a message for patient to call me back regarding her ambien medication. Awaiting call back.
I called and spoke with the pharmacist and she indicated that everything seemed to be fine with the e-script and stated that she is not quite sure why they called.
Kenneth/telephone  Received: Today       Russell Sherwin [facility] Front [facility]                     Pt stated the date to the Rx for Ambien was not right and she is requesting a Rx called to [facility]. Pts number is [phone].         
Received call from pharmacy, the needs clarification on the ambien that was sent in as it states not the exceed 3 refills before 3/22/17.
chest pain

## 2021-07-07 ENCOUNTER — OFFICE VISIT (OUTPATIENT)
Dept: ENDOCRINOLOGY | Age: 61
End: 2021-07-07
Payer: COMMERCIAL

## 2021-07-07 VITALS
HEIGHT: 63 IN | DIASTOLIC BLOOD PRESSURE: 78 MMHG | WEIGHT: 175 LBS | TEMPERATURE: 97.1 F | OXYGEN SATURATION: 95 % | HEART RATE: 86 BPM | BODY MASS INDEX: 31.01 KG/M2 | SYSTOLIC BLOOD PRESSURE: 99 MMHG

## 2021-07-07 DIAGNOSIS — E11.9 TYPE 2 DIABETES MELLITUS WITHOUT COMPLICATION, WITHOUT LONG-TERM CURRENT USE OF INSULIN (HCC): ICD-10-CM

## 2021-07-07 DIAGNOSIS — E78.2 MIXED HYPERLIPIDEMIA: ICD-10-CM

## 2021-07-07 DIAGNOSIS — E66.9 NON MORBID OBESITY: ICD-10-CM

## 2021-07-07 DIAGNOSIS — E11.65 TYPE 2 DIABETES MELLITUS WITH HYPERGLYCEMIA, WITHOUT LONG-TERM CURRENT USE OF INSULIN (HCC): Primary | ICD-10-CM

## 2021-07-07 LAB — HBA1C MFR BLD HPLC: 7 %

## 2021-07-07 PROCEDURE — 3051F HG A1C>EQUAL 7.0%<8.0%: CPT | Performed by: INTERNAL MEDICINE

## 2021-07-07 PROCEDURE — 83036 HEMOGLOBIN GLYCOSYLATED A1C: CPT | Performed by: INTERNAL MEDICINE

## 2021-07-07 PROCEDURE — 99214 OFFICE O/P EST MOD 30 MIN: CPT | Performed by: INTERNAL MEDICINE

## 2021-07-07 NOTE — PROGRESS NOTES
Chief Complaint   Patient presents with    Diabetes       History of Present Illness: Nilson Degroot is a 57 y.o. female here for follow-up. Type 2 diabetes mellitus, on Januvia. Nonischemic cardiomyopathy, has AICD, LVAD  Shortness of breath on exertion  On Januvia, no recent labs  Could not tolerate metformin: Reports leg pain    Brother  from DM - post transplant       Seen Neurologist,rheumatologist for neuropathy. Could not tolerate gabapentin, Lyrica in the past  Complains of tingling and numbness in the feet    Type 2 Diabetes was diagnosed in 10/2012   Cardiovascular risk factors: family history, CHF     No blurred vision    Wt Readings from Last 3 Encounters:   21 175 lb (79.4 kg)   20 178 lb (80.7 kg)   10/03/19 166 lb (75.3 kg)       BP Readings from Last 3 Encounters:   21 99/78   10/03/19 (!) 86/73   19 100/70       Past Medical History:   Diagnosis Date    CAD (coronary artery disease)     CH[facility]congestive heart failure) [facility]     Depression     DM (diabetes mellitus) ([facility])     Heart disease     Neuropathy      Current Outpatient Medications   Medication Sig    SITagliptin (Januvia) 100 mg tablet TAKE 1 TABLET DAILY Need follow up for future refills    LORazepam (ATIVAN) 1 mg tablet Take 1 mg by mouth two (2) times a day.  hydrALAZINE (APRESOLINE) 25 mg tablet Take 25 mg by mouth three (3) times daily.  losartan (COZAAR) 25 mg tablet Take 25 mg by mouth daily.  lovastatin (MEVACOR) 20 mg tablet Take 1 Tab by mouth nightly.  warfarin (COUMADIN) 5 mg tablet Depending on INR    oxybutynin (DITROPAN) 5 mg tablet Take 5 mg by mouth daily.  glucose blood VI test strips (ONETOUCH ULTRA TEST) strip Test blood glucose twice daily Dx Code: E11.65    potassium chloride 20 mEq TbER Take 20 mEq by mouth daily.     zolpidem (AMBIEN) 10 mg tablet TAKE 1 TABLET BY MOUTH EVERY DAY AT BEDTIME AS NEEDED FOR SLEEP (Patient not taking: Reported on 2021)    amLODIPine (NORVASC) 5 mg tablet Take 2.5 mg by mouth daily. (Patient not taking: Reported on 7/7/2021)     No current facility-administered medications for this visit. Allergies   Allergen Reactions    Compazine [Prochlorperazine] Nausea and Vomiting    Metoclopramide Itching    Morphine Hives and Itching    Oxycodone-Acetaminophen Itching    Reglan [Metoclopramide Hcl] Itching    Sulfa (Sulfonamide Antibiotics) Nausea Only    Vicodin [Hydrocodone-Acetaminophen] Hives and Itching    Glimepiride Rash         Review of Systems:  -   - Per HPI  -     Physical Examination:   Blood pressure 99/78, pulse 86, temperature 97.1 °F (36.2 °C), height 5' 3\" (1.6 m), weight 175 lb (79.4 kg), SpO2 95 %. Estimated body mass index is 31 kg/m² as calculated from the following:    Height as of this encounter: 5' 3\" (1.6 m). -   Weight as of this encounter: 175 lb (79.4 kg).   - General: pleasant, no distress, good eye contact  - HEENT: no pallor, no periorbital edema, EOMI  - Neck: supple, no thyromegaly  - Cardiovascular: regular, normal rate, normal S1 and S2  - Respiratory: clear to auscultation bilaterally  -   - Extremties- no edema, .  - Neurological: alert and oriented  - Psychiatric: normal mood and affect  - Skin: color, texture, turgor normal.     History of brown felix    Data Reviewed:       Lab Results   Component Value Date/Time    Hemoglobin A1c 7.3 (H) 07/15/2016 09:05 AM    Hemoglobin A1c 6.5 (H) 05/05/2015 09:46 AM    Hemoglobin A1c 6.1 (H) 01/28/2015 09:30 AM    Glucose 146 (H) 10/03/2019 02:45 PM    Glucose  03/04/2020 02:23 PM    Microalb/Creat ratio (ug/mg creat.) 16.0 10/03/2019 02:45 PM    LDL,Direct 136 (H) 10/03/2019 02:45 PM    LDL, calculated 64 05/05/2015 09:46 AM    Creatinine 0.76 10/03/2019 02:45 PM      Lab Results   Component Value Date/Time    Cholesterol, total 159 05/05/2015 09:46 AM    HDL Cholesterol 69 05/05/2015 09:46 AM    LDL,Direct 136 (H) 10/03/2019 02:45 PM LDL, calculated 64 05/05/2015 09:46 AM    Triglyceride 132 05/05/2015 09:46 AM     Lab Results   Component Value Date/Time    GFR est AA 99 10/03/2019 02:45 PM    GFR est non-AA 86 10/03/2019 02:45 PM    Creatinine 0.76 10/03/2019 02:45 PM    BUN 13 10/03/2019 02:45 PM    Sodium 138 10/03/2019 02:45 PM    Potassium 4.2 10/03/2019 02:45 PM    Chloride 98 10/03/2019 02:45 PM    CO2 24 10/03/2019 02:45 PM      Lab Results   Component Value Date/Time    TSH 1.250 03/20/2013 11:09 AM      Lab Results   Component Value Date/Time    Glucose 146 (H) 10/03/2019 02:45 PM    Glucose  03/04/2020 02:23 PM      Lab Results   Component Value Date/Time    Cholesterol, total 159 05/05/2015 09:46 AM    HDL Cholesterol 69 05/05/2015 09:46 AM    LDL,Direct 136 (H) 10/03/2019 02:45 PM    LDL, calculated 64 05/05/2015 09:46 AM    Triglyceride 132 05/05/2015 09:46 AM     Assessment/Plan:     1. Type 2 Diabetes Mellitus ,neuropathy,  Lab Results   Component Value Date/Time    Hemoglobin A1c 7.3 (H) 07/15/2016 09:05 AM    Hemoglobin A1c (POC) 7.0 07/07/2021 03:22 PM     Januvia   Frequent yeast infections, however most of them are triggered by antibiotic usage- so no SGLT2 inhibitors  Follow-up with ophthalmology, podiatry    2. Peripheral neuropathy/RLE hammer toe/carpel tunnel syndrome  Extensive work up for neuropathy by neurologist.  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 3. Hx NICM likely secondary to myocarditis. AICD  LVAD    4. Dyslipidemia :statin intolerance, tolerating Mevacor              Thank you for allowing me to participate in the care of this patient.     Tenzin Trejo MD      Patient verbalized understanding

## 2021-07-07 NOTE — LETTER
7/7/2021    Patient: Cherelle Yan   YOB: 1960   Date of Visit: 7/7/2021     Shyanne Riojas, [address] [facility] [address]  Via Fax: [phone]    Dear Shyanne Riojas DO,      Thank you for referring Ms. Ulysses Cliche to [id number] [place] [address] for evaluation. My notes for this consultation are attached. If you have questions, please do not hesitate to call me. I look forward to following your patient along with you.       Sincerely,    Cornelius Adams MD

## 2021-07-08 LAB
ANION GAP SERPL CALC-SCNC: 6 MMOL/L (ref 5–15)
BUN SERPL-MCNC: 12 MG/DL (ref 6–20)
BUN/CREAT SERPL: 14 (ref 12–20)
CALCIUM SERPL-MCNC: 8.8 MG/DL (ref 8.5–10.1)
CHLORIDE SERPL-SCNC: 106 MMOL/L (ref 97–108)
CO2 SERPL-SCNC: 27 MMOL/L (ref 21–32)
CREAT SERPL-MCNC: 0.83 MG/DL (ref 0.55–1.02)
GLUCOSE SERPL-MCNC: 166 MG/DL (ref 65–100)
LDLC SERPL DIRECT ASSAY-MCNC: 107 MG/DL (ref 0–100)
POTASSIUM SERPL-SCNC: 4 MMOL/L (ref 3.5–5.1)
SODIUM SERPL-SCNC: 139 MMOL/L (ref 136–145)

## 2021-12-22 DIAGNOSIS — E11.9 TYPE 2 DIABETES MELLITUS WITHOUT COMPLICATION, WITHOUT LONG-TERM CURRENT USE OF INSULIN (HCC): ICD-10-CM

## 2021-12-22 DIAGNOSIS — E78.2 MIXED HYPERLIPIDEMIA: ICD-10-CM

## 2021-12-22 NOTE — TELEPHONE ENCOUNTER
PCP: Chris Mays DO    Last appt: 7/7/2021  No future appointments.     Requested Prescriptions     Pending Prescriptions Disp Refills    SITagliptin (Januvia) 100 mg tablet 90 Tablet 3     Sig: TAKE 1 TABLET DAILY

## 2022-03-10 ENCOUNTER — HOSPITAL ENCOUNTER (OUTPATIENT)
Dept: INFUSION THERAPY | Age: 62
Discharge: HOME | End: 2022-03-10

## 2022-03-11 ENCOUNTER — HOSPITAL ENCOUNTER (OUTPATIENT)
Dept: INFUSION THERAPY | Age: 62
Discharge: HOME | End: 2022-03-11
Payer: COMMERCIAL

## 2022-03-11 VITALS
TEMPERATURE: 96.6 F | HEIGHT: 63 IN | SYSTOLIC BLOOD PRESSURE: 110 MMHG | RESPIRATION RATE: 18 BRPM | HEART RATE: 86 BPM | OXYGEN SATURATION: 96 % | DIASTOLIC BLOOD PRESSURE: 79 MMHG | BODY MASS INDEX: 26.68 KG/M2 | WEIGHT: 150.6 LBS

## 2022-03-11 LAB
ALBUMIN SERPL-MCNC: 3.5 G/DL (ref 3.5–5)
ALBUMIN/GLOB SERPL: 0.8 {RATIO} (ref 1.1–2.2)
ALP SERPL-CCNC: 70 U/L (ref 45–117)
ALT SERPL-CCNC: 21 U/L (ref 12–78)
ANION GAP SERPL CALC-SCNC: 8 MMOL/L (ref 5–15)
AST SERPL-CCNC: 26 U/L (ref 15–37)
BASOPHILS # BLD: 0.1 K/UL (ref 0–0.1)
BASOPHILS NFR BLD: 1 % (ref 0–1)
BILIRUB DIRECT SERPL-MCNC: 0.1 MG/DL (ref 0–0.2)
BILIRUB SERPL-MCNC: 0.5 MG/DL (ref 0.2–1)
BUN SERPL-MCNC: 5 MG/DL (ref 6–20)
BUN/CREAT SERPL: 5 (ref 12–20)
CALCIUM SERPL-MCNC: 9.2 MG/DL (ref 8.5–10.1)
CHLORIDE SERPL-SCNC: 100 MMOL/L (ref 97–108)
CO2 SERPL-SCNC: 26 MMOL/L (ref 21–32)
CREAT SERPL-MCNC: 0.97 MG/DL (ref 0.55–1.02)
DIFFERENTIAL METHOD BLD: ABNORMAL
EOSINOPHIL # BLD: 0.2 K/UL (ref 0–0.4)
EOSINOPHIL NFR BLD: 3 % (ref 0–7)
ERYTHROCYTE [DISTWIDTH] IN BLOOD BY AUTOMATED COUNT: 21.5 % (ref 11.5–14.5)
GLOBULIN SER CALC-MCNC: 4.2 G/DL (ref 2–4)
GLUCOSE SERPL-MCNC: 120 MG/DL (ref 65–100)
HCT VFR BLD AUTO: 31.1 % (ref 35–47)
HGB BLD-MCNC: 8.9 G/DL (ref 11.5–16)
IMM GRANULOCYTES # BLD AUTO: 0 K/UL (ref 0–0.04)
IMM GRANULOCYTES NFR BLD AUTO: 0 % (ref 0–0.5)
LYMPHOCYTES # BLD: 1.4 K/UL (ref 0.8–3.5)
LYMPHOCYTES NFR BLD: 21 % (ref 12–49)
MCH RBC QN AUTO: 21.7 PG (ref 26–34)
MCHC RBC AUTO-ENTMCNC: 28.6 G/DL (ref 30–36.5)
MCV RBC AUTO: 75.7 FL (ref 80–99)
MONOCYTES # BLD: 0.5 K/UL (ref 0–1)
MONOCYTES NFR BLD: 8 % (ref 5–13)
NEUTS SEG # BLD: 4.5 K/UL (ref 1.8–8)
NEUTS SEG NFR BLD: 67 % (ref 32–75)
NRBC # BLD: 0 K/UL (ref 0–0.01)
NRBC BLD-RTO: 0 PER 100 WBC
PLATELET # BLD AUTO: 280 K/UL (ref 150–400)
PMV BLD AUTO: 10.8 FL (ref 8.9–12.9)
POTASSIUM SERPL-SCNC: 4 MMOL/L (ref 3.5–5.1)
PROT SERPL-MCNC: 7.7 G/DL (ref 6.4–8.2)
RBC # BLD AUTO: 4.11 M/UL (ref 3.8–5.2)
RBC MORPH BLD: ABNORMAL
RBC MORPH BLD: ABNORMAL
SODIUM SERPL-SCNC: 134 MMOL/L (ref 136–145)
WBC # BLD AUTO: 6.7 K/UL (ref 3.6–11)

## 2022-03-11 PROCEDURE — 36415 COLL VENOUS BLD VENIPUNCTURE: CPT

## 2022-03-11 PROCEDURE — 96366 THER/PROPH/DIAG IV INF ADDON: CPT

## 2022-03-11 PROCEDURE — 96365 THER/PROPH/DIAG IV INF INIT: CPT

## 2022-03-11 PROCEDURE — 85025 COMPLETE CBC W/AUTO DIFF WBC: CPT

## 2022-03-11 PROCEDURE — 74011000258 HC RX REV CODE- 258

## 2022-03-11 PROCEDURE — 74011250636 HC RX REV CODE- 250/636

## 2022-03-11 PROCEDURE — 80048 BASIC METABOLIC PNL TOTAL CA: CPT

## 2022-03-11 PROCEDURE — 80076 HEPATIC FUNCTION PANEL: CPT

## 2022-03-11 RX ADMIN — DALBAVANCIN 1500 MG: 500 INJECTION, POWDER, FOR SOLUTION INTRAVENOUS at 15:41

## 2022-03-11 NOTE — PROGRESS NOTES
Outpatient Infusion Center Short Visit Progress Note     Patient admitted to [facility] for [address] ambulatory in stable condition. Assessment completed. No new concerns voiced. Covid Screening      1. Do you have any symptoms of COVID-19? SOB, coughing, fever, or generally not feeling well ? NO  2. Have you been exposed to COVID-19 recently? NO  3. Have you had any recent contact with family/friend that has a pending COVID test? NO    Vital Signs:  Visit Vitals  /79   Pulse 86   Temp (!) 96.6 °F (35.9 °C)   Resp 18   Ht 5' 3\" (1.6 m)   Wt 68.3 kg (150 lb 9.6 oz)   SpO2 96%   BMI 26.68 kg/m²     /79   Pulse 86   Temp (!) 96.6 °F (35.9 °C)   Resp 18   Ht 5' 3\" (1.6 m)   Wt 68.3 kg (150 lb 9.6 oz)   SpO2 96%   BMI 26.68 kg/m²     PIV right hand with positive blood return. Lab Results:  Recent Results (from the past 12 hour(s))   CBC WITH AUTOMATED DIFF    Collection Time: 03/11/22  3:13 PM   Result Value Ref Range    WBC 6.7 3.6 - 11.0 K/uL    RBC 4.11 3.80 - 5.20 M/uL    HGB 8.9 (L) 11.5 - 16.0 g/dL    HCT 31.1 (L) 35.0 - 47.0 %    MCV 75.7 (L) 80.0 - 99.0 FL    MCH 21.7 (L) 26.0 - 34.0 PG    MCHC 28.6 (L) 30.0 - 36.5 g/dL    RDW 21.5 (H) 11.5 - 14.5 %    PLATELET 709 961 - 802 K/uL    MPV 10.8 8.9 - 12.9 FL    NRBC 0.0 0  WBC    ABSOLUTE NRBC 0.00 0.00 - 0.01 K/uL    NEUTROPHILS 67 32 - 75 %    LYMPHOCYTES 21 12 - 49 %    MONOCYTES 8 5 - 13 %    EOSINOPHILS 3 0 - 7 %    BASOPHILS 1 0 - 1 %    IMMATURE GRANULOCYTES 0 0.0 - 0.5 %    ABS. NEUTROPHILS 4.5 1.8 - 8.0 K/UL    ABS. LYMPHOCYTES 1.4 0.8 - 3.5 K/UL    ABS. MONOCYTES 0.5 0.0 - 1.0 K/UL    ABS. EOSINOPHILS 0.2 0.0 - 0.4 K/UL    ABS. BASOPHILS 0.1 0.0 - 0.1 K/UL    ABS. IMM.  GRANS. 0.0 0.00 - 0.04 K/UL    DF AUTOMATED      RBC COMMENTS OVALOCYTES  PRESENT        RBC COMMENTS ANISOCYTOSIS  1+       METABOLIC PANEL, BASIC    Collection Time: 03/11/22  3:13 PM   Result Value Ref Range    Sodium 134 (L) 136 - 145 mmol/L    Potassium 4.0 3.5 - 5.1 mmol/L    Chloride 100 97 - 108 mmol/L    CO2 26 21 - 32 mmol/L    Anion gap 8 5 - 15 mmol/L    Glucose 120 (H) 65 - 100 mg/dL    BUN 5 (L) 6 - 20 MG/DL    Creatinine 0.97 0.55 - 1.02 MG/DL    BUN/Creatinine ratio 5 (L) 12 - 20      GFR est AA >60 >60 ml/min/1.73m2    GFR est non-AA 58 (L) >60 ml/min/1.73m2    Calcium 9.2 8.5 - 10.1 MG/DL   HEPATIC FUNCTION PANEL    Collection Time: 03/11/22  3:13 PM   Result Value Ref Range    Protein, total 7.7 6.4 - 8.2 g/dL    Albumin 3.5 3.5 - 5.0 g/dL    Globulin 4.2 (H) 2.0 - 4.0 g/dL    A-G Ratio 0.8 (L) 1.1 - 2.2      Bilirubin, total 0.5 0.2 - 1.0 MG/DL    Bilirubin, direct 0.1 0.0 - 0.2 MG/DL    Alk. phosphatase 70 45 - 117 U/L    AST (SGOT) 26 15 - 37 U/L    ALT (SGPT) 21 12 - 78 U/L             Medications:  Medications Administered     dalbavancin (DALVANCE) 1,500 mg in dextrose 5% 250 mL, overfill volume 25 mL IVPB     Admin Date  03/11/2022 Action  New Bag Dose  1,500 mg Rate  700 mL/hr Route  IntraVENous Administered By  Cassandra Mabry RN                 Patient tolerated treatment well. Patient discharged from [place] [address] ambulatory in no distress. Patient aware of next appointment.     Future Appointments   Date Time Provider Antonino Tao   3/18/2022  9:30 AM [facility]   3/25/2022  9:30 AM [facility]   4/1/2022  9:30 AM [facility]   4/7/2022  9:30 AM [facility]   4/14/2022  9:30 AM SS [facility]   4/21/2022 11:30 AM SS INF7 CH2 <1H [facility]

## 2022-03-17 ENCOUNTER — APPOINTMENT (OUTPATIENT)
Dept: INFUSION THERAPY | Age: 62
End: 2022-03-17

## 2022-03-18 ENCOUNTER — HOSPITAL ENCOUNTER (OUTPATIENT)
Dept: INFUSION THERAPY | Age: 62
Discharge: HOME | End: 2022-03-18
Payer: COMMERCIAL

## 2022-03-18 VITALS
TEMPERATURE: 97.6 F | HEART RATE: 89 BPM | DIASTOLIC BLOOD PRESSURE: 84 MMHG | BODY MASS INDEX: 27.57 KG/M2 | OXYGEN SATURATION: 98 % | WEIGHT: 155.6 LBS | SYSTOLIC BLOOD PRESSURE: 111 MMHG | HEIGHT: 63 IN | RESPIRATION RATE: 18 BRPM

## 2022-03-18 PROBLEM — Z95.811 LVAD (LEFT VENTRICULAR ASSIST DEVICE) PRESENT (HCC): Status: ACTIVE | Noted: 2019-05-02

## 2022-03-18 LAB
ALBUMIN SERPL-MCNC: 3.2 G/DL (ref 3.5–5)
ALBUMIN/GLOB SERPL: 0.8 {RATIO} (ref 1.1–2.2)
ALP SERPL-CCNC: 70 U/L (ref 45–117)
ALT SERPL-CCNC: 22 U/L (ref 12–78)
ANION GAP SERPL CALC-SCNC: 8 MMOL/L (ref 5–15)
AST SERPL-CCNC: 36 U/L (ref 15–37)
BASOPHILS # BLD: 0.1 K/UL (ref 0–0.1)
BASOPHILS NFR BLD: 1 % (ref 0–1)
BILIRUB DIRECT SERPL-MCNC: 0.1 MG/DL (ref 0–0.2)
BILIRUB SERPL-MCNC: 0.5 MG/DL (ref 0.2–1)
BUN SERPL-MCNC: 6 MG/DL (ref 6–20)
BUN/CREAT SERPL: 6 (ref 12–20)
CALCIUM SERPL-MCNC: 8.6 MG/DL (ref 8.5–10.1)
CHLORIDE SERPL-SCNC: 103 MMOL/L (ref 97–108)
CO2 SERPL-SCNC: 24 MMOL/L (ref 21–32)
COMMENT, HOLDF: NORMAL
CREAT SERPL-MCNC: 0.93 MG/DL (ref 0.55–1.02)
DIFFERENTIAL METHOD BLD: ABNORMAL
EOSINOPHIL # BLD: 0.2 K/UL (ref 0–0.4)
EOSINOPHIL NFR BLD: 3 % (ref 0–7)
ERYTHROCYTE [DISTWIDTH] IN BLOOD BY AUTOMATED COUNT: 20.8 % (ref 11.5–14.5)
GLOBULIN SER CALC-MCNC: 4.2 G/DL (ref 2–4)
GLUCOSE SERPL-MCNC: 143 MG/DL (ref 65–100)
HCT VFR BLD AUTO: 31 % (ref 35–47)
HGB BLD-MCNC: 8.9 G/DL (ref 11.5–16)
IMM GRANULOCYTES # BLD AUTO: 0.1 K/UL (ref 0–0.04)
IMM GRANULOCYTES NFR BLD AUTO: 1 % (ref 0–0.5)
LYMPHOCYTES # BLD: 1 K/UL (ref 0.8–3.5)
LYMPHOCYTES NFR BLD: 15 % (ref 12–49)
MCH RBC QN AUTO: 21.7 PG (ref 26–34)
MCHC RBC AUTO-ENTMCNC: 28.7 G/DL (ref 30–36.5)
MCV RBC AUTO: 75.6 FL (ref 80–99)
MONOCYTES # BLD: 0.3 K/UL (ref 0–1)
MONOCYTES NFR BLD: 5 % (ref 5–13)
NEUTS SEG # BLD: 4.8 K/UL (ref 1.8–8)
NEUTS SEG NFR BLD: 75 % (ref 32–75)
NRBC # BLD: 0 K/UL (ref 0–0.01)
NRBC BLD-RTO: 0 PER 100 WBC
PLATELET # BLD AUTO: 256 K/UL (ref 150–400)
POTASSIUM SERPL-SCNC: 5.3 MMOL/L (ref 3.5–5.1)
PROT SERPL-MCNC: 7.4 G/DL (ref 6.4–8.2)
RBC # BLD AUTO: 4.1 M/UL (ref 3.8–5.2)
RBC MORPH BLD: ABNORMAL
SAMPLES BEING HELD,HOLD: NORMAL
SODIUM SERPL-SCNC: 135 MMOL/L (ref 136–145)
WBC # BLD AUTO: 6.5 K/UL (ref 3.6–11)

## 2022-03-18 PROCEDURE — 74011250636 HC RX REV CODE- 250/636

## 2022-03-18 PROCEDURE — 80076 HEPATIC FUNCTION PANEL: CPT

## 2022-03-18 PROCEDURE — 36415 COLL VENOUS BLD VENIPUNCTURE: CPT

## 2022-03-18 PROCEDURE — 96365 THER/PROPH/DIAG IV INF INIT: CPT

## 2022-03-18 PROCEDURE — 74011000258 HC RX REV CODE- 258

## 2022-03-18 PROCEDURE — 80048 BASIC METABOLIC PNL TOTAL CA: CPT

## 2022-03-18 PROCEDURE — 85025 COMPLETE CBC W/AUTO DIFF WBC: CPT

## 2022-03-18 RX ADMIN — DALBAVANCIN 1000 MG: 500 INJECTION, POWDER, FOR SOLUTION INTRAVENOUS at 11:15

## 2022-03-18 NOTE — PROGRESS NOTES
[facility] Progress Note    Date: 2022    Name: Rachelle Olivarez    MRN: [id number]         : 1960    Ms. Grider Arrived ambulatory and in no distress for  [facility]. Assessment was completed, no acute issues at this time, no new complaints voiced. 24 G PIV established to left hand after 3 attempts, + blood return. Labs drawn and sent for processing. Ms. Kong Lo vitals were reviewed. Visit Vitals  /84   Pulse 89   Temp 97.6 °F (36.4 °C)   Resp 18   Ht 5' 3\" (1.6 m)   Wt 70.6 kg (155 lb 9.6 oz)   SpO2 98%   Breastfeeding No   BMI 27.56 kg/m²       Lab results were obtained and reviewed. Recent Results (from the past 12 hour(s))   SAMPLES BEING HELD    Collection Time: 22 10:30 AM   Result Value Ref Range    SAMPLES BEING HELD JOLENE     COMMENT        Add-on orders for these samples will be processed based on acceptable specimen integrity and analyte stability, which may vary by analyte. CBC WITH AUTOMATED DIFF    Collection Time: 22 10:33 AM   Result Value Ref Range    WBC 6.5 3.6 - 11.0 K/uL    RBC 4.10 3.80 - 5.20 M/uL    HGB 8.9 (L) 11.5 - 16.0 g/dL    HCT 31.0 (L) 35.0 - 47.0 %    MCV 75.6 (L) 80.0 - 99.0 FL    MCH 21.7 (L) 26.0 - 34.0 PG    MCHC 28.7 (L) 30.0 - 36.5 g/dL    RDW 20.8 (H) 11.5 - 14.5 %    PLATELET 627 155 - 794 K/uL    NRBC 0.0 0  WBC    ABSOLUTE NRBC 0.00 0.00 - 0.01 K/uL    NEUTROPHILS 75 32 - 75 %    LYMPHOCYTES 15 12 - 49 %    MONOCYTES 5 5 - 13 %    EOSINOPHILS 3 0 - 7 %    BASOPHILS 1 0 - 1 %    IMMATURE GRANULOCYTES 1 (H) 0.0 - 0.5 %    ABS. NEUTROPHILS 4.8 1.8 - 8.0 K/UL    ABS. LYMPHOCYTES 1.0 0.8 - 3.5 K/UL    ABS. MONOCYTES 0.3 0.0 - 1.0 K/UL    ABS. EOSINOPHILS 0.2 0.0 - 0.4 K/UL    ABS. BASOPHILS 0.1 0.0 - 0.1 K/UL    ABS. IMM.  GRANS. 0.1 (H) 0.00 - 0.04 K/UL    DF SMEAR SCANNED      RBC COMMENTS HYPOCHROMIA  2+        RBC COMMENTS MICROCYTOSIS  1+        RBC COMMENTS RBC FRAGMENTS     METABOLIC PANEL, BASIC    Collection Time: 22 10:33 AM   Result Value Ref Range    Sodium 135 (L) 136 - 145 mmol/L    Potassium 5.3 (H) 3.5 - 5.1 mmol/L    Chloride 103 97 - 108 mmol/L    CO2 24 21 - 32 mmol/L    Anion gap 8 5 - 15 mmol/L    Glucose 143 (H) 65 - 100 mg/dL    BUN 6 6 - 20 MG/DL    Creatinine 0.93 0.55 - 1.02 MG/DL    BUN/Creatinine ratio 6 (L) 12 - 20      GFR est AA >60 >60 ml/min/1.73m2    GFR est non-AA >60 >60 ml/min/1.73m2    Calcium 8.6 8.5 - 10.1 MG/DL   HEPATIC FUNCTION PANEL    Collection Time: 03/18/22 10:33 AM   Result Value Ref Range    Protein, total 7.4 6.4 - 8.2 g/dL    Albumin 3.2 (L) 3.5 - 5.0 g/dL    Globulin 4.2 (H) 2.0 - 4.0 g/dL    A-G Ratio 0.8 (L) 1.1 - 2.2      Bilirubin, total 0.5 0.2 - 1.0 MG/DL    Bilirubin, direct 0.1 0.0 - 0.2 MG/DL    Alk. phosphatase 70 45 - 117 U/L    AST (SGOT) 36 15 - 37 U/L    ALT (SGPT) 22 12 - 78 U/L       Medications:  Medications Administered     dalbavancin (DALVANCE) 1,000 mg in dextrose 5% 250 mL, overfill volume 25 mL IVPB     Admin Date  03/18/2022 Action  New Bag Dose  1,000 mg Rate  650 mL/hr Route  IntraVENous Administered By  Harris Warren RN                  Ms. Noe Ramirez tolerated treatment well and was discharged from [place] [address] in stable condition at 1155. PIV flushed & removed. She is to return on March 25 at 0930 for her next appointment.     [facility]  March 18, 2022

## 2022-03-19 PROBLEM — F51.01 PRIMARY INSOMNIA: Status: ACTIVE | Noted: 2019-05-02

## 2022-03-20 PROBLEM — G62.9 POLYNEUROPATHY, UNSPECIFIED: Status: ACTIVE | Noted: 2019-10-04

## 2022-03-24 ENCOUNTER — APPOINTMENT (OUTPATIENT)
Dept: INFUSION THERAPY | Age: 62
End: 2022-03-24

## 2022-03-25 ENCOUNTER — HOSPITAL ENCOUNTER (OUTPATIENT)
Dept: INFUSION THERAPY | Age: 62
Discharge: HOME | End: 2022-03-25
Payer: COMMERCIAL

## 2022-03-25 VITALS
DIASTOLIC BLOOD PRESSURE: 76 MMHG | OXYGEN SATURATION: 98 % | RESPIRATION RATE: 18 BRPM | TEMPERATURE: 96.7 F | SYSTOLIC BLOOD PRESSURE: 97 MMHG | HEART RATE: 82 BPM

## 2022-03-25 LAB
BASOPHILS # BLD: 0 K/UL (ref 0–0.1)
BASOPHILS NFR BLD: 0 % (ref 0–1)
DIFFERENTIAL METHOD BLD: ABNORMAL
EOSINOPHIL # BLD: 0.2 K/UL (ref 0–0.4)
EOSINOPHIL NFR BLD: 3 % (ref 0–7)
ERYTHROCYTE [DISTWIDTH] IN BLOOD BY AUTOMATED COUNT: 20.1 % (ref 11.5–14.5)
HCT VFR BLD AUTO: 29.8 % (ref 35–47)
HGB BLD-MCNC: 8.8 G/DL (ref 11.5–16)
IMM GRANULOCYTES # BLD AUTO: 0 K/UL (ref 0–0.04)
IMM GRANULOCYTES NFR BLD AUTO: 0 % (ref 0–0.5)
LYMPHOCYTES # BLD: 1 K/UL (ref 0.8–3.5)
LYMPHOCYTES NFR BLD: 14 % (ref 12–49)
MCH RBC QN AUTO: 21.8 PG (ref 26–34)
MCHC RBC AUTO-ENTMCNC: 29.5 G/DL (ref 30–36.5)
MCV RBC AUTO: 73.9 FL (ref 80–99)
MONOCYTES # BLD: 0.4 K/UL (ref 0–1)
MONOCYTES NFR BLD: 6 % (ref 5–13)
NEUTS SEG # BLD: 5.3 K/UL (ref 1.8–8)
NEUTS SEG NFR BLD: 77 % (ref 32–75)
NRBC # BLD: 0 K/UL (ref 0–0.01)
NRBC BLD-RTO: 0 PER 100 WBC
PLATELET # BLD AUTO: 273 K/UL (ref 150–400)
RBC # BLD AUTO: 4.03 M/UL (ref 3.8–5.2)
RBC MORPH BLD: ABNORMAL
RBC MORPH BLD: ABNORMAL
WBC # BLD AUTO: 6.9 K/UL (ref 3.6–11)

## 2022-03-25 PROCEDURE — 96365 THER/PROPH/DIAG IV INF INIT: CPT

## 2022-03-25 PROCEDURE — 85025 COMPLETE CBC W/AUTO DIFF WBC: CPT

## 2022-03-25 PROCEDURE — 74011000258 HC RX REV CODE- 258

## 2022-03-25 PROCEDURE — 74011250636 HC RX REV CODE- 250/636

## 2022-03-25 PROCEDURE — 36415 COLL VENOUS BLD VENIPUNCTURE: CPT

## 2022-03-25 RX ADMIN — DALBAVANCIN 1000 MG: 500 INJECTION, POWDER, FOR SOLUTION INTRAVENOUS at 16:42

## 2022-03-25 NOTE — PROGRESS NOTES
[facility] Progress Note    Date: 2022    Name: Rose Byrne    MRN: [id number]         : 1960    Ms. Grider Arrived ambulatory and in no distress for [facility]/ Regimen. Assessment was completed, no acute issues at this time, pt c/o diarrhea. PIV placed in left hand x 4 sticks, labs drawn & sent for processing. Ms. Jessica Roy vitals were reviewed.   Patient Vitals for the past 12 hrs:   Temp Pulse Resp BP SpO2   22 1555 (!) 96.7 °F (35.9 °C) 82 18 105/69 98 %       Labs pending in CC.     1630: SBAR given to [facility], [facility] [address], RN  2022

## 2022-03-25 NOTE — PROGRESS NOTES
[facility] Progress Note    Date: 2022    Name: Juan Paredes    MRN: [id number]         : 1960    1630: SBAR received from [address] [place] [address].     1700: Lab called and Gold top that was sent over clotted. RN will try to draw another gold top. Patient difficult stick. 1715: RN tried to draw gold top off already established line. Line was not giving enough blood to waste and obtain sample. Patient refused another peripheral stick at this time. Lab results were obtained and reviewed. Recent Results (from the past 12 hour(s))   CBC WITH AUTOMATED DIFF    Collection Time: 22  3:57 PM   Result Value Ref Range    WBC 6.9 3.6 - 11.0 K/uL    RBC 4.03 3.80 - 5.20 M/uL    HGB 8.8 (L) 11.5 - 16.0 g/dL    HCT 29.8 (L) 35.0 - 47.0 %    MCV 73.9 (L) 80.0 - 99.0 FL    MCH 21.8 (L) 26.0 - 34.0 PG    MCHC 29.5 (L) 30.0 - 36.5 g/dL    RDW 20.1 (H) 11.5 - 14.5 %    PLATELET 965 179 - 020 K/uL    NRBC 0.0 0  WBC    ABSOLUTE NRBC 0.00 0.00 - 0.01 K/uL    NEUTROPHILS PENDING %    LYMPHOCYTES PENDING %    MONOCYTES PENDING %    EOSINOPHILS PENDING %    BASOPHILS PENDING %    IMMATURE GRANULOCYTES PENDING %    ABS. NEUTROPHILS PENDING K/UL    ABS. LYMPHOCYTES PENDING K/UL    ABS. MONOCYTES PENDING K/UL    ABS. EOSINOPHILS PENDING K/UL    ABS. BASOPHILS PENDING K/UL    ABS. IMM. GRANS. PENDING K/UL    DF PENDING        Medications:  Medications Administered     dalbavancin (DALVANCE) 1,000 mg in dextrose 5% 250 mL, overfill volume 25 mL IVPB     Admin Date  2022 Action  New Bag Dose  1,000 mg Rate  650 mL/hr Route  IntraVENous Administered By  Sadia Friedman RN              Ms. Betsey Bray tolerated treatment well and was discharged from [place] [address] in stable condition at 1730. PIV flushed & removed. She is to return on 2022 at 0930am for her next appointment.     Marie Ray RN  2022

## 2022-03-31 ENCOUNTER — APPOINTMENT (OUTPATIENT)
Dept: INFUSION THERAPY | Age: 62
End: 2022-03-31

## 2022-04-01 ENCOUNTER — HOSPITAL ENCOUNTER (OUTPATIENT)
Dept: INFUSION THERAPY | Age: 62
Discharge: HOME | End: 2022-04-01
Payer: COMMERCIAL

## 2022-04-01 VITALS
OXYGEN SATURATION: 98 % | HEART RATE: 79 BPM | RESPIRATION RATE: 18 BRPM | SYSTOLIC BLOOD PRESSURE: 101 MMHG | TEMPERATURE: 96.7 F | DIASTOLIC BLOOD PRESSURE: 76 MMHG

## 2022-04-01 LAB
ALBUMIN SERPL-MCNC: 3.2 G/DL (ref 3.5–5)
ALBUMIN/GLOB SERPL: 0.8 {RATIO} (ref 1.1–2.2)
ALP SERPL-CCNC: 69 U/L (ref 45–117)
ALT SERPL-CCNC: 20 U/L (ref 12–78)
ANION GAP SERPL CALC-SCNC: 7 MMOL/L (ref 5–15)
AST SERPL-CCNC: 19 U/L (ref 15–37)
BASOPHILS # BLD: 0.1 K/UL (ref 0–0.1)
BASOPHILS NFR BLD: 1 % (ref 0–1)
BILIRUB DIRECT SERPL-MCNC: 0.1 MG/DL (ref 0–0.2)
BILIRUB SERPL-MCNC: 0.3 MG/DL (ref 0.2–1)
BUN SERPL-MCNC: 11 MG/DL (ref 6–20)
BUN/CREAT SERPL: 12 (ref 12–20)
CALCIUM SERPL-MCNC: 9.1 MG/DL (ref 8.5–10.1)
CHLORIDE SERPL-SCNC: 105 MMOL/L (ref 97–108)
CO2 SERPL-SCNC: 24 MMOL/L (ref 21–32)
CREAT SERPL-MCNC: 0.95 MG/DL (ref 0.55–1.02)
DIFFERENTIAL METHOD BLD: ABNORMAL
EOSINOPHIL # BLD: 0.3 K/UL (ref 0–0.4)
EOSINOPHIL NFR BLD: 4 % (ref 0–7)
ERYTHROCYTE [DISTWIDTH] IN BLOOD BY AUTOMATED COUNT: 18.8 % (ref 11.5–14.5)
GLOBULIN SER CALC-MCNC: 3.9 G/DL (ref 2–4)
GLUCOSE SERPL-MCNC: 174 MG/DL (ref 65–100)
HCT VFR BLD AUTO: 29.5 % (ref 35–47)
HGB BLD-MCNC: 8.5 G/DL (ref 11.5–16)
IMM GRANULOCYTES # BLD AUTO: 0.1 K/UL (ref 0–0.04)
IMM GRANULOCYTES NFR BLD AUTO: 1 % (ref 0–0.5)
INR PPP: 2.1 (ref 0.9–1.1)
LYMPHOCYTES # BLD: 1.2 K/UL (ref 0.8–3.5)
LYMPHOCYTES NFR BLD: 18 % (ref 12–49)
MCH RBC QN AUTO: 21.6 PG (ref 26–34)
MCHC RBC AUTO-ENTMCNC: 28.8 G/DL (ref 30–36.5)
MCV RBC AUTO: 75.1 FL (ref 80–99)
MONOCYTES # BLD: 0.4 K/UL (ref 0–1)
MONOCYTES NFR BLD: 6 % (ref 5–13)
NEUTS SEG # BLD: 4.5 K/UL (ref 1.8–8)
NEUTS SEG NFR BLD: 70 % (ref 32–75)
NRBC # BLD: 0 K/UL (ref 0–0.01)
NRBC BLD-RTO: 0 PER 100 WBC
PLATELET # BLD AUTO: 244 K/UL (ref 150–400)
PMV BLD AUTO: 10.8 FL (ref 8.9–12.9)
POTASSIUM SERPL-SCNC: 3.8 MMOL/L (ref 3.5–5.1)
PROT SERPL-MCNC: 7.1 G/DL (ref 6.4–8.2)
PROTHROMBIN TIME: 21.5 SEC (ref 9–11.1)
RBC # BLD AUTO: 3.93 M/UL (ref 3.8–5.2)
RBC MORPH BLD: ABNORMAL
SODIUM SERPL-SCNC: 136 MMOL/L (ref 136–145)
WBC # BLD AUTO: 6.6 K/UL (ref 3.6–11)

## 2022-04-01 PROCEDURE — 74011250636 HC RX REV CODE- 250/636: Performed by: PHYSICIAN ASSISTANT

## 2022-04-01 PROCEDURE — 85025 COMPLETE CBC W/AUTO DIFF WBC: CPT

## 2022-04-01 PROCEDURE — 80076 HEPATIC FUNCTION PANEL: CPT

## 2022-04-01 PROCEDURE — 85610 PROTHROMBIN TIME: CPT

## 2022-04-01 PROCEDURE — 36415 COLL VENOUS BLD VENIPUNCTURE: CPT

## 2022-04-01 PROCEDURE — 74011000258 HC RX REV CODE- 258: Performed by: PHYSICIAN ASSISTANT

## 2022-04-01 PROCEDURE — 80048 BASIC METABOLIC PNL TOTAL CA: CPT

## 2022-04-01 PROCEDURE — 96365 THER/PROPH/DIAG IV INF INIT: CPT

## 2022-04-01 RX ADMIN — DALBAVANCIN 1000 MG: 500 INJECTION, POWDER, FOR SOLUTION INTRAVENOUS at 11:00

## 2022-04-07 ENCOUNTER — APPOINTMENT (OUTPATIENT)
Dept: INFUSION THERAPY | Age: 62
End: 2022-04-07

## 2022-04-14 ENCOUNTER — APPOINTMENT (OUTPATIENT)
Dept: INFUSION THERAPY | Age: 62
End: 2022-04-14

## 2022-04-21 ENCOUNTER — HOSPITAL ENCOUNTER (OUTPATIENT)
Dept: INFUSION THERAPY | Age: 62
Discharge: HOME | End: 2022-04-21